# Patient Record
Sex: FEMALE | Race: OTHER | HISPANIC OR LATINO
[De-identification: names, ages, dates, MRNs, and addresses within clinical notes are randomized per-mention and may not be internally consistent; named-entity substitution may affect disease eponyms.]

---

## 2017-01-31 ENCOUNTER — OTHER (OUTPATIENT)
Age: 67
End: 2017-01-31

## 2017-08-23 ENCOUNTER — NON-APPOINTMENT (OUTPATIENT)
Age: 67
End: 2017-08-23

## 2017-08-23 ENCOUNTER — APPOINTMENT (OUTPATIENT)
Dept: CARDIOLOGY | Facility: CLINIC | Age: 67
End: 2017-08-23
Payer: MEDICARE

## 2017-08-23 VITALS
DIASTOLIC BLOOD PRESSURE: 70 MMHG | WEIGHT: 125 LBS | BODY MASS INDEX: 22.15 KG/M2 | HEART RATE: 77 BPM | HEIGHT: 63 IN | SYSTOLIC BLOOD PRESSURE: 112 MMHG | OXYGEN SATURATION: 96 %

## 2017-08-23 PROCEDURE — 99214 OFFICE O/P EST MOD 30 MIN: CPT

## 2017-08-23 PROCEDURE — 93000 ELECTROCARDIOGRAM COMPLETE: CPT

## 2018-02-14 ENCOUNTER — APPOINTMENT (OUTPATIENT)
Dept: CARDIOLOGY | Facility: CLINIC | Age: 68
End: 2018-02-14

## 2018-06-13 ENCOUNTER — NON-APPOINTMENT (OUTPATIENT)
Age: 68
End: 2018-06-13

## 2018-06-13 ENCOUNTER — APPOINTMENT (OUTPATIENT)
Dept: CARDIOLOGY | Facility: CLINIC | Age: 68
End: 2018-06-13
Payer: MEDICARE

## 2018-06-13 VITALS
DIASTOLIC BLOOD PRESSURE: 70 MMHG | BODY MASS INDEX: 22.15 KG/M2 | WEIGHT: 125 LBS | HEART RATE: 57 BPM | OXYGEN SATURATION: 95 % | HEIGHT: 63 IN | SYSTOLIC BLOOD PRESSURE: 122 MMHG

## 2018-06-13 DIAGNOSIS — R00.1 BRADYCARDIA, UNSPECIFIED: ICD-10-CM

## 2018-06-13 PROCEDURE — 93000 ELECTROCARDIOGRAM COMPLETE: CPT

## 2018-06-13 PROCEDURE — 99214 OFFICE O/P EST MOD 30 MIN: CPT

## 2018-06-14 ENCOUNTER — APPOINTMENT (OUTPATIENT)
Dept: CARDIOLOGY | Facility: CLINIC | Age: 68
End: 2018-06-14
Payer: MEDICARE

## 2018-06-14 PROBLEM — R00.1 SINUS BRADYCARDIA: Status: ACTIVE | Noted: 2017-08-23

## 2018-06-14 PROCEDURE — 93306 TTE W/DOPPLER COMPLETE: CPT

## 2018-06-14 PROCEDURE — 93227 XTRNL ECG REC<48 HR R&I: CPT

## 2018-06-14 PROCEDURE — 93225 XTRNL ECG REC<48 HRS REC: CPT

## 2018-07-10 ENCOUNTER — APPOINTMENT (OUTPATIENT)
Dept: CARDIOLOGY | Facility: CLINIC | Age: 68
End: 2018-07-10

## 2018-08-08 ENCOUNTER — APPOINTMENT (OUTPATIENT)
Dept: CARDIOLOGY | Facility: CLINIC | Age: 68
End: 2018-08-08

## 2018-09-25 ENCOUNTER — APPOINTMENT (OUTPATIENT)
Dept: CARDIOLOGY | Facility: CLINIC | Age: 68
End: 2018-09-25
Payer: MEDICARE

## 2018-09-25 DIAGNOSIS — R94.31 ABNORMAL ELECTROCARDIOGRAM [ECG] [EKG]: ICD-10-CM

## 2018-09-25 PROCEDURE — 93015 CV STRESS TEST SUPVJ I&R: CPT

## 2018-09-26 PROBLEM — R94.31 ABNORMAL ECG DURING EXERCISE STRESS TEST: Status: ACTIVE | Noted: 2018-09-26

## 2018-10-29 ENCOUNTER — APPOINTMENT (OUTPATIENT)
Dept: CARDIOLOGY | Facility: CLINIC | Age: 68
End: 2018-10-29
Payer: MEDICARE

## 2018-10-29 PROCEDURE — 78452 HT MUSCLE IMAGE SPECT MULT: CPT

## 2018-10-29 PROCEDURE — 93015 CV STRESS TEST SUPVJ I&R: CPT

## 2018-10-29 PROCEDURE — A9500: CPT

## 2020-01-09 ENCOUNTER — EMERGENCY (EMERGENCY)
Facility: HOSPITAL | Age: 70
LOS: 1 days | Discharge: HOME | End: 2020-01-09
Attending: EMERGENCY MEDICINE
Payer: MEDICARE

## 2020-01-09 VITALS
OXYGEN SATURATION: 98 % | DIASTOLIC BLOOD PRESSURE: 95 MMHG | TEMPERATURE: 98 F | SYSTOLIC BLOOD PRESSURE: 169 MMHG | RESPIRATION RATE: 16 BRPM | HEART RATE: 72 BPM

## 2020-01-09 VITALS
HEART RATE: 63 BPM | OXYGEN SATURATION: 100 % | SYSTOLIC BLOOD PRESSURE: 138 MMHG | DIASTOLIC BLOOD PRESSURE: 85 MMHG | TEMPERATURE: 99 F | RESPIRATION RATE: 15 BRPM

## 2020-01-09 DIAGNOSIS — Z90.49 ACQUIRED ABSENCE OF OTHER SPECIFIED PARTS OF DIGESTIVE TRACT: Chronic | ICD-10-CM

## 2020-01-09 DIAGNOSIS — H26.40 UNSPECIFIED SECONDARY CATARACT: Chronic | ICD-10-CM

## 2020-01-09 LAB
ALBUMIN SERPL ELPH-MCNC: 4.3 G/DL — SIGNIFICANT CHANGE UP (ref 3.3–5)
ALP SERPL-CCNC: 55 U/L — SIGNIFICANT CHANGE UP (ref 40–120)
ALT FLD-CCNC: 19 U/L — SIGNIFICANT CHANGE UP (ref 10–45)
ANION GAP SERPL CALC-SCNC: 13 MMOL/L — SIGNIFICANT CHANGE UP (ref 5–17)
APPEARANCE UR: CLEAR — SIGNIFICANT CHANGE UP
APTT BLD: 27.3 SEC — LOW (ref 27.5–36.3)
AST SERPL-CCNC: 16 U/L — SIGNIFICANT CHANGE UP (ref 10–40)
BACTERIA # UR AUTO: NEGATIVE — SIGNIFICANT CHANGE UP
BASOPHILS # BLD AUTO: 0.06 K/UL — SIGNIFICANT CHANGE UP (ref 0–0.2)
BASOPHILS NFR BLD AUTO: 0.9 % — SIGNIFICANT CHANGE UP (ref 0–2)
BILIRUB SERPL-MCNC: 0.5 MG/DL — SIGNIFICANT CHANGE UP (ref 0.2–1.2)
BILIRUB UR-MCNC: NEGATIVE — SIGNIFICANT CHANGE UP
BUN SERPL-MCNC: 14 MG/DL — SIGNIFICANT CHANGE UP (ref 7–23)
CALCIUM SERPL-MCNC: 9.3 MG/DL — SIGNIFICANT CHANGE UP (ref 8.4–10.5)
CHLORIDE SERPL-SCNC: 102 MMOL/L — SIGNIFICANT CHANGE UP (ref 96–108)
CO2 SERPL-SCNC: 25 MMOL/L — SIGNIFICANT CHANGE UP (ref 22–31)
COLOR SPEC: COLORLESS — SIGNIFICANT CHANGE UP
CREAT SERPL-MCNC: 0.83 MG/DL — SIGNIFICANT CHANGE UP (ref 0.5–1.3)
DIFF PNL FLD: ABNORMAL
EOSINOPHIL # BLD AUTO: 0.02 K/UL — SIGNIFICANT CHANGE UP (ref 0–0.5)
EOSINOPHIL NFR BLD AUTO: 0.3 % — SIGNIFICANT CHANGE UP (ref 0–6)
EPI CELLS # UR: 0 /HPF — SIGNIFICANT CHANGE UP
GLUCOSE SERPL-MCNC: 103 MG/DL — HIGH (ref 70–99)
GLUCOSE UR QL: NEGATIVE — SIGNIFICANT CHANGE UP
HCT VFR BLD CALC: 37.8 % — SIGNIFICANT CHANGE UP (ref 34.5–45)
HGB BLD-MCNC: 12.4 G/DL — SIGNIFICANT CHANGE UP (ref 11.5–15.5)
HYALINE CASTS # UR AUTO: 0 /LPF — SIGNIFICANT CHANGE UP (ref 0–2)
IMM GRANULOCYTES NFR BLD AUTO: 0.2 % — SIGNIFICANT CHANGE UP (ref 0–1.5)
INR BLD: 1.05 RATIO — SIGNIFICANT CHANGE UP (ref 0.88–1.16)
KETONES UR-MCNC: NEGATIVE — SIGNIFICANT CHANGE UP
LEUKOCYTE ESTERASE UR-ACNC: NEGATIVE — SIGNIFICANT CHANGE UP
LYMPHOCYTES # BLD AUTO: 2.79 K/UL — SIGNIFICANT CHANGE UP (ref 1–3.3)
LYMPHOCYTES # BLD AUTO: 42.5 % — SIGNIFICANT CHANGE UP (ref 13–44)
MCHC RBC-ENTMCNC: 28.1 PG — SIGNIFICANT CHANGE UP (ref 27–34)
MCHC RBC-ENTMCNC: 32.8 GM/DL — SIGNIFICANT CHANGE UP (ref 32–36)
MCV RBC AUTO: 85.5 FL — SIGNIFICANT CHANGE UP (ref 80–100)
MONOCYTES # BLD AUTO: 0.25 K/UL — SIGNIFICANT CHANGE UP (ref 0–0.9)
MONOCYTES NFR BLD AUTO: 3.8 % — SIGNIFICANT CHANGE UP (ref 2–14)
NEUTROPHILS # BLD AUTO: 3.43 K/UL — SIGNIFICANT CHANGE UP (ref 1.8–7.4)
NEUTROPHILS NFR BLD AUTO: 52.3 % — SIGNIFICANT CHANGE UP (ref 43–77)
NITRITE UR-MCNC: NEGATIVE — SIGNIFICANT CHANGE UP
NRBC # BLD: 0 /100 WBCS — SIGNIFICANT CHANGE UP (ref 0–0)
PH UR: 7 — SIGNIFICANT CHANGE UP (ref 5–8)
PLATELET # BLD AUTO: 220 K/UL — SIGNIFICANT CHANGE UP (ref 150–400)
POTASSIUM SERPL-MCNC: 3.5 MMOL/L — SIGNIFICANT CHANGE UP (ref 3.5–5.3)
POTASSIUM SERPL-SCNC: 3.5 MMOL/L — SIGNIFICANT CHANGE UP (ref 3.5–5.3)
PROT SERPL-MCNC: 7.4 G/DL — SIGNIFICANT CHANGE UP (ref 6–8.3)
PROT UR-MCNC: NEGATIVE — SIGNIFICANT CHANGE UP
PROTHROM AB SERPL-ACNC: 12 SEC — SIGNIFICANT CHANGE UP (ref 10–12.9)
RBC # BLD: 4.42 M/UL — SIGNIFICANT CHANGE UP (ref 3.8–5.2)
RBC # FLD: 12.7 % — SIGNIFICANT CHANGE UP (ref 10.3–14.5)
RBC CASTS # UR COMP ASSIST: 1 /HPF — SIGNIFICANT CHANGE UP (ref 0–4)
SODIUM SERPL-SCNC: 140 MMOL/L — SIGNIFICANT CHANGE UP (ref 135–145)
SP GR SPEC: 1.01 — LOW (ref 1.01–1.02)
TROPONIN T, HIGH SENSITIVITY RESULT: <6 NG/L — SIGNIFICANT CHANGE UP (ref 0–51)
UROBILINOGEN FLD QL: NEGATIVE — SIGNIFICANT CHANGE UP
WBC # BLD: 6.56 K/UL — SIGNIFICANT CHANGE UP (ref 3.8–10.5)
WBC # FLD AUTO: 6.56 K/UL — SIGNIFICANT CHANGE UP (ref 3.8–10.5)
WBC UR QL: 0 /HPF — SIGNIFICANT CHANGE UP (ref 0–5)

## 2020-01-09 PROCEDURE — 99284 EMERGENCY DEPT VISIT MOD MDM: CPT

## 2020-01-09 PROCEDURE — 70450 CT HEAD/BRAIN W/O DYE: CPT

## 2020-01-09 PROCEDURE — 84484 ASSAY OF TROPONIN QUANT: CPT

## 2020-01-09 PROCEDURE — 81001 URINALYSIS AUTO W/SCOPE: CPT

## 2020-01-09 PROCEDURE — 85730 THROMBOPLASTIN TIME PARTIAL: CPT

## 2020-01-09 PROCEDURE — 70450 CT HEAD/BRAIN W/O DYE: CPT | Mod: 26

## 2020-01-09 PROCEDURE — 71046 X-RAY EXAM CHEST 2 VIEWS: CPT

## 2020-01-09 PROCEDURE — 71046 X-RAY EXAM CHEST 2 VIEWS: CPT | Mod: 26

## 2020-01-09 PROCEDURE — 93005 ELECTROCARDIOGRAM TRACING: CPT

## 2020-01-09 PROCEDURE — 85027 COMPLETE CBC AUTOMATED: CPT

## 2020-01-09 PROCEDURE — 85610 PROTHROMBIN TIME: CPT

## 2020-01-09 PROCEDURE — 99284 EMERGENCY DEPT VISIT MOD MDM: CPT | Mod: 25

## 2020-01-09 PROCEDURE — 80053 COMPREHEN METABOLIC PANEL: CPT

## 2020-01-09 NOTE — ED PROVIDER NOTE - PHYSICAL EXAMINATION
Attending Betty Jones: Gen: NAD, heent: atrauamtic, eomi, perrla, mmm, op pink, uvula midline, neck; nttp, no nuchal rigidity, chest: nttp, no crepitus, cv: rrr, no murmurs, lungs: ctab, abd: soft, nontender, nondistended, no peritoneal signs, +BS, no guarding, ext: wwp, neg homans, skin: no rash, neuro: awake and alert, following commands, speech clear, sensation and strength intact, no focal deficits, resting tremor

## 2020-01-09 NOTE — ED PROVIDER NOTE - NEUROLOGICAL, MLM
Alert and oriented,  right LE with resting tremor, no focal deficits, no motor or sensory deficits. finger to nose and rapid alternating movement intact

## 2020-01-09 NOTE — ED ADULT NURSE NOTE - OBJECTIVE STATEMENT
70 y/o female with PMH HTN HLD vertigo arthritis presenting to ED for multiple medical complaints. per pt, in the last 2 weeks, she has increased shaking in her lower extremities, a pressure headache to the back of the head & pain to the back of the neck, intermittent chest pressure. Upon exam pt A&Ox3 gross neuro intact, lungs cta bilaterally, no difficulty speaking in complete sentences, s1s2 heart sounds heard, pulses x 4, amos x4, abdomen soft nontender nondistended, skin intact. Pt denies chest pain at this time, sob, ha, n/v/d, abdominal pain, f/c, urinary symptoms, hematuria. placed on cardiac monitor NSR 70s. 18 gauge placed to left a/c. 68 y/o female with PMH HTN HLD vertigo arthritis presenting to ED for multiple medical complaints. per pt, in the last 2 weeks, she has increased shaking in her lower extremities, a pressure headache to the back of the head & pain to the back of the neck, intermittent chest pressure. Upon exam pt A&Ox3 gross neuro intact, no facial droop noted, strength equal in all extremities, no loss in sensation, 2 mm PERRLA, lungs cta bilaterally, no difficulty speaking in complete sentences, s1s2 heart sounds heard, pulses x 4, amos x4, abdomen soft nontender nondistended, skin intact. Pt denies chest pain at this time, sob, ha, n/v/d, abdominal pain, f/c, urinary symptoms, hematuria. placed on cardiac monitor NSR 70s. 18 gauge placed to left a/c.

## 2020-01-09 NOTE — ED PROVIDER NOTE - PATIENT PORTAL LINK FT
You can access the FollowMyHealth Patient Portal offered by [facility] by registering at the following website: [email or web address]. By joining [employer]’s FollowMyHealth portal, you will also be able to view your health information using other applications (apps) compatible with our system.

## 2020-01-09 NOTE — ED PROVIDER NOTE - NSFOLLOWUPCLINICS_GEN_ALL_ED_FT
[facility]  Neurology  [address] [place] [address] [address] [place] [address]  Phone: [phone]  Fax:   Follow Up Time:

## 2020-01-09 NOTE — ED ADULT NURSE NOTE - PMH
Arthritis    Chronic back pain    GERD with esophagitis    HLD (hyperlipidemia)    HTN (hypertension)    HTN (hypertension)    Hyperlipidemia    Palpitations    Panic attack    Vertigo    Vertigo

## 2020-01-09 NOTE — ED PROVIDER NOTE - OBJECTIVE STATEMENT
68 y/o female htn, hld, vertigo presents to the ED for vague dizziness today that started after her shower this morning. states not true room spinning sensation but did feel some associated visual symptoms. patient did not have diplopia but states it was mildly blurred. additionally she had an episode of mid chest discomfort described as a pressure that lasted only a few seconds then resolved. + nausea. no headache/fever/chills/vomiting/diarrhea/urinary complaints. patient states she has had no symptoms since she got here. of note patient under care of neurologist for resting tremor that has been bothersome for multiple months and does not seem to be getting better.

## 2020-01-09 NOTE — ED PROVIDER NOTE - PROGRESS NOTE DETAILS
spoke with patient, she is ambulating, feeling well now. discussed results and follow up information. states she would like a new neuro referral because she is frustrated with the lack of treatment for tremor. given opportunity to ask questions - Navya Morales PA-C

## 2020-01-09 NOTE — ED PROVIDER NOTE - RESPIRATORY [-], MLM
no shortness of breath Implemented All Universal Safety Interventions:  [place] to call system. Call bell, personal items and telephone within reach. Instruct patient to call for assistance. Room bathroom lighting operational. Non-slip footwear when patient is off stretcher. Physically safe environment: no spills, clutter or unnecessary equipment. Stretcher in lowest position, wheels locked, appropriate side rails in place.

## 2020-01-09 NOTE — ED PROVIDER NOTE - ATTENDING CONTRIBUTION TO CARE
Attending MD Betty Jones:   I personally have seen and examined this patient.  Physician assistant note reviewed and agree on plan of care and except where noted.  See HPI, PE, and MDM for details.

## 2020-01-09 NOTE — ED PROVIDER NOTE - CLINICAL SUMMARY MEDICAL DECISION MAKING FREE TEXT BOX
Attending Betty Jones: 70 y/o female presenting with resolved chest pain and concern for tremor. pt has seen her neurologist for tremor and reportedly had comprehensive work up. pt with resting tremor on exam athat resolves with effort. no fevers or chills. pt was started on medication for tremor with neurologist. pt also reports chest pain that started earlier in the day. currently denies any chest pain. ekg shows no evidence of st elevation. pt on monitor. b/l pulses in place and as pt does not have current discomfort less likely dissection. troponin negative making acute myocardial ischemia less likely. pt has neurologist to follow up with for tremor and cards followup. no coughs or fevers, no evidence of infectious etiology on exam. plan to d/c with return precautions

## 2020-02-01 ENCOUNTER — OUTPATIENT (OUTPATIENT)
Dept: OUTPATIENT SERVICES | Facility: HOSPITAL | Age: 70
LOS: 1 days | End: 2020-02-01
Payer: MEDICARE

## 2020-02-01 DIAGNOSIS — Z90.49 ACQUIRED ABSENCE OF OTHER SPECIFIED PARTS OF DIGESTIVE TRACT: Chronic | ICD-10-CM

## 2020-02-01 DIAGNOSIS — H26.40 UNSPECIFIED SECONDARY CATARACT: Chronic | ICD-10-CM

## 2020-02-26 DIAGNOSIS — Z71.89 OTHER SPECIFIED COUNSELING: ICD-10-CM

## 2020-03-01 PROCEDURE — G9001: CPT

## 2020-10-01 ENCOUNTER — EMERGENCY (EMERGENCY)
Facility: HOSPITAL | Age: 70
LOS: 1 days | Discharge: HOME | End: 2020-10-01
Attending: EMERGENCY MEDICINE
Payer: MEDICARE

## 2020-10-01 VITALS
SYSTOLIC BLOOD PRESSURE: 123 MMHG | OXYGEN SATURATION: 100 % | DIASTOLIC BLOOD PRESSURE: 81 MMHG | RESPIRATION RATE: 18 BRPM | HEART RATE: 86 BPM | TEMPERATURE: 99 F

## 2020-10-01 VITALS
WEIGHT: 117.07 LBS | RESPIRATION RATE: 17 BRPM | HEIGHT: 63 IN | HEART RATE: 66 BPM | OXYGEN SATURATION: 99 % | TEMPERATURE: 98 F | SYSTOLIC BLOOD PRESSURE: 152 MMHG | DIASTOLIC BLOOD PRESSURE: 92 MMHG

## 2020-10-01 DIAGNOSIS — Z90.49 ACQUIRED ABSENCE OF OTHER SPECIFIED PARTS OF DIGESTIVE TRACT: Chronic | ICD-10-CM

## 2020-10-01 DIAGNOSIS — H26.40 UNSPECIFIED SECONDARY CATARACT: Chronic | ICD-10-CM

## 2020-10-01 LAB
ALBUMIN SERPL ELPH-MCNC: 4.8 G/DL — SIGNIFICANT CHANGE UP (ref 3.3–5)
ALP SERPL-CCNC: 57 U/L — SIGNIFICANT CHANGE UP (ref 40–120)
ALT FLD-CCNC: 16 U/L — SIGNIFICANT CHANGE UP (ref 10–45)
ANION GAP SERPL CALC-SCNC: 12 MMOL/L — SIGNIFICANT CHANGE UP (ref 5–17)
APTT BLD: 26.5 SEC — LOW (ref 27.5–35.5)
AST SERPL-CCNC: 14 U/L — SIGNIFICANT CHANGE UP (ref 10–40)
BASOPHILS # BLD AUTO: 0.05 K/UL — SIGNIFICANT CHANGE UP (ref 0–0.2)
BASOPHILS NFR BLD AUTO: 0.7 % — SIGNIFICANT CHANGE UP (ref 0–2)
BILIRUB SERPL-MCNC: 0.4 MG/DL — SIGNIFICANT CHANGE UP (ref 0.2–1.2)
BUN SERPL-MCNC: 14 MG/DL — SIGNIFICANT CHANGE UP (ref 7–23)
CALCIUM SERPL-MCNC: 9.6 MG/DL — SIGNIFICANT CHANGE UP (ref 8.4–10.5)
CHLORIDE SERPL-SCNC: 104 MMOL/L — SIGNIFICANT CHANGE UP (ref 96–108)
CO2 SERPL-SCNC: 25 MMOL/L — SIGNIFICANT CHANGE UP (ref 22–31)
CREAT SERPL-MCNC: 0.82 MG/DL — SIGNIFICANT CHANGE UP (ref 0.5–1.3)
EOSINOPHIL # BLD AUTO: 0.01 K/UL — SIGNIFICANT CHANGE UP (ref 0–0.5)
EOSINOPHIL NFR BLD AUTO: 0.1 % — SIGNIFICANT CHANGE UP (ref 0–6)
GLUCOSE SERPL-MCNC: 105 MG/DL — HIGH (ref 70–99)
HCT VFR BLD CALC: 40.4 % — SIGNIFICANT CHANGE UP (ref 34.5–45)
HGB BLD-MCNC: 12.9 G/DL — SIGNIFICANT CHANGE UP (ref 11.5–15.5)
IMM GRANULOCYTES NFR BLD AUTO: 0.3 % — SIGNIFICANT CHANGE UP (ref 0–1.5)
INR BLD: 0.98 RATIO — SIGNIFICANT CHANGE UP (ref 0.88–1.16)
LIDOCAIN IGE QN: 62 U/L — HIGH (ref 7–60)
LYMPHOCYTES # BLD AUTO: 2.43 K/UL — SIGNIFICANT CHANGE UP (ref 1–3.3)
LYMPHOCYTES # BLD AUTO: 32.8 % — SIGNIFICANT CHANGE UP (ref 13–44)
MAGNESIUM SERPL-MCNC: 2.5 MG/DL — SIGNIFICANT CHANGE UP (ref 1.6–2.6)
MCHC RBC-ENTMCNC: 27.8 PG — SIGNIFICANT CHANGE UP (ref 27–34)
MCHC RBC-ENTMCNC: 31.9 GM/DL — LOW (ref 32–36)
MCV RBC AUTO: 87.1 FL — SIGNIFICANT CHANGE UP (ref 80–100)
MONOCYTES # BLD AUTO: 0.36 K/UL — SIGNIFICANT CHANGE UP (ref 0–0.9)
MONOCYTES NFR BLD AUTO: 4.9 % — SIGNIFICANT CHANGE UP (ref 2–14)
NEUTROPHILS # BLD AUTO: 4.54 K/UL — SIGNIFICANT CHANGE UP (ref 1.8–7.4)
NEUTROPHILS NFR BLD AUTO: 61.2 % — SIGNIFICANT CHANGE UP (ref 43–77)
NRBC # BLD: 0 /100 WBCS — SIGNIFICANT CHANGE UP (ref 0–0)
NT-PROBNP SERPL-SCNC: 98 PG/ML — SIGNIFICANT CHANGE UP (ref 0–300)
PLATELET # BLD AUTO: 224 K/UL — SIGNIFICANT CHANGE UP (ref 150–400)
POTASSIUM SERPL-MCNC: 4.1 MMOL/L — SIGNIFICANT CHANGE UP (ref 3.5–5.3)
POTASSIUM SERPL-SCNC: 4.1 MMOL/L — SIGNIFICANT CHANGE UP (ref 3.5–5.3)
PROT SERPL-MCNC: 7.9 G/DL — SIGNIFICANT CHANGE UP (ref 6–8.3)
PROTHROM AB SERPL-ACNC: 11.8 SEC — SIGNIFICANT CHANGE UP (ref 10.6–13.6)
RBC # BLD: 4.64 M/UL — SIGNIFICANT CHANGE UP (ref 3.8–5.2)
RBC # FLD: 13.2 % — SIGNIFICANT CHANGE UP (ref 10.3–14.5)
SODIUM SERPL-SCNC: 141 MMOL/L — SIGNIFICANT CHANGE UP (ref 135–145)
TROPONIN T, HIGH SENSITIVITY RESULT: <6 NG/L — SIGNIFICANT CHANGE UP (ref 0–51)
WBC # BLD: 7.41 K/UL — SIGNIFICANT CHANGE UP (ref 3.8–10.5)
WBC # FLD AUTO: 7.41 K/UL — SIGNIFICANT CHANGE UP (ref 3.8–10.5)

## 2020-10-01 PROCEDURE — 99285 EMERGENCY DEPT VISIT HI MDM: CPT

## 2020-10-01 PROCEDURE — 85730 THROMBOPLASTIN TIME PARTIAL: CPT

## 2020-10-01 PROCEDURE — 71046 X-RAY EXAM CHEST 2 VIEWS: CPT

## 2020-10-01 PROCEDURE — 85025 COMPLETE CBC W/AUTO DIFF WBC: CPT

## 2020-10-01 PROCEDURE — 99284 EMERGENCY DEPT VISIT MOD MDM: CPT | Mod: 25

## 2020-10-01 PROCEDURE — 85610 PROTHROMBIN TIME: CPT

## 2020-10-01 PROCEDURE — 83690 ASSAY OF LIPASE: CPT

## 2020-10-01 PROCEDURE — 80053 COMPREHEN METABOLIC PANEL: CPT

## 2020-10-01 PROCEDURE — 83880 ASSAY OF NATRIURETIC PEPTIDE: CPT

## 2020-10-01 PROCEDURE — 93010 ELECTROCARDIOGRAM REPORT: CPT

## 2020-10-01 PROCEDURE — 83735 ASSAY OF MAGNESIUM: CPT

## 2020-10-01 PROCEDURE — 93308 TTE F-UP OR LMTD: CPT

## 2020-10-01 PROCEDURE — 71046 X-RAY EXAM CHEST 2 VIEWS: CPT | Mod: 26

## 2020-10-01 PROCEDURE — 84484 ASSAY OF TROPONIN QUANT: CPT

## 2020-10-01 PROCEDURE — 93005 ELECTROCARDIOGRAM TRACING: CPT

## 2020-10-01 RX ORDER — ASPIRIN/CALCIUM CARB/MAGNESIUM 324 MG
162 TABLET ORAL ONCE
Refills: 0 | Status: COMPLETED | OUTPATIENT
Start: 2020-10-01 | End: 2020-10-01

## 2020-10-01 RX ADMIN — Medication 162 MILLIGRAM(S): at 15:07

## 2020-10-01 NOTE — ED PROVIDER NOTE - ATTENDING CONTRIBUTION TO CARE
Attending MD Betty Jones:  I personally have seen and examined this patient.  Resident note reviewed and agree on plan of care and except where noted.  See HPI, PE, and MDM for details.

## 2020-10-01 NOTE — ED PROVIDER NOTE - CARE PROVIDER_API CALL
Anupam Song  NEUROLOGY  [address] [facility] [facility] [address], [address]  [place] [address]  Phone: [phone]  Fax: [phone]  Follow Up Time: Urgent    John Muñoz  CARDIOLOGY  [address] [place] [address] Drive, [address]  [place] [address]  Phone: [phone]  Fax: [phone]  Follow Up Time: Urgent

## 2020-10-01 NOTE — ED PROVIDER NOTE - PROVIDER TOKENS
PROVIDER:[TOKEN:[[id number]],FOLLOWUP:[Urgent]],PROVIDER:[TOKEN:[[id number]],FOLLOWUP:[Urgent]]

## 2020-10-01 NOTE — ED PROVIDER NOTE - PATIENT PORTAL LINK FT
You can access the FollowMyHealth Patient Portal offered by [facility] by registering at the following website: [email or web address]. By joining [employer]’s FollowMyHealth portal, you will also be able to view your health information using other applications (apps) compatible with our system.

## 2020-10-01 NOTE — ED ADULT NURSE NOTE - CAS ELECT INFOMATION PROVIDED
DC instructions/Discharge provided to patient, patient requesting name DC instructions/Discharge instructions provided to patient. Patient ambulated out of er with steady gait accompanied by family. MIKE Andrade

## 2020-10-01 NOTE — ED PROVIDER NOTE - PHYSICAL EXAMINATION
Attending Betty Jones: Gen: NAD, heent: atrauamtic, eomi, perrla, mmm, op pink, uvula midline, neck; nttp, no nuchal rigidity, chest: nttp, no crepitus, cv: rrr, no murmurs, lungs: ctab, abd: soft, nontender, nondistended, no peritoneal signs, +BS, no guarding, ext: wwp, neg homans, skin: no rash, neuro: awake and alert, following commands, speech clear, sensation and strength intact, no focal deficits

## 2020-10-01 NOTE — ED PROVIDER NOTE - PROGRESS NOTE DETAILS
Cantu: patient and son informed of results, offered cdu for further testing but patient declines, spoke to PCP Dr. Clemons who states patient can follow up tomorrow, patient requesting neurology follow up as well, informed patient of getting further cardiac testing the next few days

## 2020-10-01 NOTE — ED ADULT NURSE REASSESSMENT NOTE - NS ED NURSE REASSESS COMMENT FT1
Patient discharged, IV brendan removed, patient verbalizes understanding of discharge instructions and agrees to following up with cardiologist/neurologist, and returning to emergency room for worsening symptoms. Patient ambulated out of er with steady gait. MIKE Andrade
1600 Patient resting in bed alert and oriented x 3, eating soup, patient denies chest pain, dizziness, blurry vision, shortness of breath and all other complaints, respirations even and nonlabored, breathing spontaneously on room air, vital signs stable. Patient to be discharged, requesting name of recommended neurologist to see outpatient due to tremors. No tremors noted currently, speech clear, pupils equal and reactive to light, no facial droop noted, normal strength/ROM in bilateral upper and lower extremities. MD Pepe Roe aware of patient's request for neurologist and at bedside, states patient OK to be discharged, pending discharge paperwork. Will continue to monitor. MIKE Andrade

## 2020-10-01 NOTE — ED PROVIDER NOTE - CLINICAL SUMMARY MEDICAL DECISION MAKING FREE TEXT BOX
70F w/ HTN, HLD presents w/ chest pain since yesterday, recent stress test 6 months ago that was WNL, patient w/ EKG WNL, will r/o acs, patient appears well w/ normal VS 70F w/ HTN, HLD presents w/ chest pain since yesterday, recent stress test 6 months ago that was WNL, patient w/ EKG WNL, will r/o acs, patient appears well w/ normal VS  Attending Betty Jones: 69 y/o female presenting with intermittent left sided cheat pain. upon arrival ekg without evidence of st elevation. pt placed on monitor. does have risk factors fro acs. initial troponin negative. offered pt CDU for tele and monitoring. pt does not want to stay in the ed and prefers to go home. long discussion with pt and will follow up with dr escobedo and given cardiology follow up. pt currently denies anychest pain . will return for any worsening pain, back discomfort of sob. hemodynamically stable and no pleuritic pain or sob making PE unlikely. pt well appearing, equal pressures in upper extremities making dissection less likely. no RUQ ttp.

## 2020-10-01 NOTE — ED PROVIDER NOTE - NS ED ROS FT
General: denies fever, chills  HENT: denies nasal congestion, rhinorrhea  Eyes: denies visual changes, blurred vision  CV: + chest pain, denies palpitations  Resp: denies difficulty breathing, cough  Abdominal: denies nausea, vomiting, diarrhea, abdominal pain  MSK: denies muscle aches, leg swelling  Neuro: denies headaches, numbness, tingling  Skin: denies rashes, bruises  Pysch: denies HI/SI  Heme: denies petechia, abnormal bleeding

## 2020-10-01 NOTE — ED PROVIDER NOTE - OBJECTIVE STATEMENT
Attending Betty Jones: 71 y/o female presenting with chest pain for last 2 days. pain located on left side of chest and radiates to left neck. pain associated with some nausea no vomiting. pt denies any abdominal pain. pt described the pain as pressure. no h/o similar. h/o hyperlipidemia and HTN. pt denies and SOB or difficulty breathing. pain is intermittent since starting. no associated fevers or diaphoresis. pain unchanged with exertion. went to [place] 3 weeks. ago. Attending Betty Jones: 69 y/o female presenting with chest pain for last 2 days. pain located on left side of chest and radiates to left neck. pain associated with some nausea no vomiting. pt denies any abdominal pain. pt described the pain as pressure. no h/o similar. h/o hyperlipidemia and HTN. pt denies and SOB or difficulty breathing. pain is intermittent since starting. no associated fevers or diaphoresis. pain unchanged with exertion. went to [place] 3 weeks. ago. had stress performed 6 months ago

## 2020-10-01 NOTE — ED ADULT NURSE NOTE - OBJECTIVE STATEMENT
70F to ED c/o chest pain since last night, nausea without vomiting, and intermittent abdominal pain. Patient is primarily Green[place]ic speaking, alert and oriented x4, calm/cooperative, NAD, VSS. Patient is put on cardiac monitor, NSR at this time. Patient is on room air, see VS. Patient states that she had a stress test 6 months ago. Patient states that she was at her doctor today and was told to come to the ED. Patient has 18 gauge placed to R AC in the ED.

## 2020-10-28 ENCOUNTER — APPOINTMENT (OUTPATIENT)
Dept: CARDIOLOGY | Facility: CLINIC | Age: 70
End: 2020-10-28
Payer: MEDICARE

## 2020-10-28 ENCOUNTER — TRANSCRIPTION ENCOUNTER (OUTPATIENT)
Age: 70
End: 2020-10-28

## 2020-10-28 ENCOUNTER — NON-APPOINTMENT (OUTPATIENT)
Age: 70
End: 2020-10-28

## 2020-10-28 VITALS
HEIGHT: 63 IN | WEIGHT: 116 LBS | SYSTOLIC BLOOD PRESSURE: 130 MMHG | BODY MASS INDEX: 20.55 KG/M2 | OXYGEN SATURATION: 95 % | DIASTOLIC BLOOD PRESSURE: 80 MMHG | HEART RATE: 79 BPM | TEMPERATURE: 98.4 F

## 2020-10-28 DIAGNOSIS — R00.2 PALPITATIONS: ICD-10-CM

## 2020-10-28 DIAGNOSIS — R09.89 OTHER SPECIFIED SYMPTOMS AND SIGNS INVOLVING THE CIRCULATORY AND RESPIRATORY SYSTEMS: ICD-10-CM

## 2020-10-28 DIAGNOSIS — Z23 ENCOUNTER FOR IMMUNIZATION: ICD-10-CM

## 2020-10-28 PROCEDURE — 93306 TTE W/DOPPLER COMPLETE: CPT

## 2020-10-28 PROCEDURE — 90662 IIV NO PRSV INCREASED AG IM: CPT

## 2020-10-28 PROCEDURE — G0008: CPT

## 2020-10-28 PROCEDURE — 99215 OFFICE O/P EST HI 40 MIN: CPT

## 2020-10-28 PROCEDURE — 93000 ELECTROCARDIOGRAM COMPLETE: CPT

## 2020-10-28 PROCEDURE — 99072 ADDL SUPL MATRL&STAF TM PHE: CPT

## 2020-10-28 NOTE — REASON FOR VISIT
[Follow-Up - Clinic] : a clinic follow-up of [Abnormal ECG] : an abnormal ECG [Hyperlipidemia] : hyperlipidemia [Hypertension] : hypertension [Palpitations] : palpitations [Dizziness] : dizziness

## 2020-10-28 NOTE — HISTORY OF PRESENT ILLNESS
[FreeTextEntry1] : Quiana is a pleasant 70-year-old female with dyslipidemia, hypertension, and recently went to [facility] emergency department for palpitations and worsening generalized aches along with essential tremor. I reviewed her labs x-rays and EKG from the hospital along with head CT scan showing small vessel ischemic changes but no acute findings. It sounds as though she was given anticoagulation for unclear reasons if there is no clear evidence of arrhythmia. Essential tremor hampers the interpretation of her ECGs but it appears to be sinus rhythm. Lately she is complaining also of dizziness and intermittent palpitations. She is seen to have previous ectopy on prior Holter monitoring.She reports that she does need a flu vaccine as well.

## 2020-10-28 NOTE — DISCUSSION/SUMMARY
[___ Week(s)] : [unfilled] week(s) [FreeTextEntry3] : Echocardiogram, regular stress test, Holter monitor. [FreeTextEntry1] : She qill stay off her anticoagulation at this time as there is no clear indication as to why this was prescribed. She's not been taking anyhow. She'll continue on aspirin 81 mg daily, simvastatin 10 mg daily for cardiovascular risk reduction. She will continue with lisinopril 10 mg daily for blood pressure management. I prescribed propranolol milligrams b.i.d. or as directed possibly even t.i.d. for essential tremor management. She'll follow up with her neurologist she reports. We will give her the Flu vaccine today for Flu prevention as per her request.\par \par To further cardiac risk stratify, I have ordered an echocardiogram to assess LV function and valvular status.To better assess carotid disease burden and serve as a cardiovascular risk marker, especially with the possibility of a left carotid bruit on exam, I've ordered a carotid bilateral duplex study. I might consider a followup Holter monitor when I see her in followup in approximately one month to review testing bassed on how she is doing. She'll see neurology in follow up as well and follow up with you for general care. I recommended a heart healthy lifestyle including a low-saturated fat, low cholesterol diet with improved aerobic physical fitness over time for cardiovascular benefits. I will also order thyroid function tests.

## 2020-10-28 NOTE — REVIEW OF SYSTEMS
[Palpitations] : palpitations [Joint Pain] : joint pain [Anxiety] : anxiety [Negative] : Heme/Lymph [Dizziness] : dizziness

## 2020-10-29 ENCOUNTER — NON-APPOINTMENT (OUTPATIENT)
Age: 70
End: 2020-10-29

## 2020-12-08 ENCOUNTER — NON-APPOINTMENT (OUTPATIENT)
Age: 70
End: 2020-12-08

## 2020-12-08 ENCOUNTER — APPOINTMENT (OUTPATIENT)
Dept: CARDIOLOGY | Facility: CLINIC | Age: 70
End: 2020-12-08
Payer: MEDICARE

## 2020-12-08 VITALS
BODY MASS INDEX: 20.55 KG/M2 | HEART RATE: 111 BPM | WEIGHT: 116 LBS | TEMPERATURE: 97.2 F | OXYGEN SATURATION: 82 % | HEIGHT: 63 IN | SYSTOLIC BLOOD PRESSURE: 128 MMHG | DIASTOLIC BLOOD PRESSURE: 80 MMHG

## 2020-12-08 PROCEDURE — 99214 OFFICE O/P EST MOD 30 MIN: CPT

## 2020-12-08 PROCEDURE — 93000 ELECTROCARDIOGRAM COMPLETE: CPT

## 2020-12-08 PROCEDURE — 99072 ADDL SUPL MATRL&STAF TM PHE: CPT

## 2020-12-08 NOTE — PHYSICAL EXAM
[General Appearance - Well Developed] : well developed [Normal Appearance] : normal appearance [Well Groomed] : well groomed [General Appearance - Well Nourished] : well nourished [No Deformities] : no deformities [General Appearance - In No Acute Distress] : no acute distress [Normal Conjunctiva] : the conjunctiva exhibited no abnormalities [Eyelids - No Xanthelasma] : the eyelids demonstrated no xanthelasmas [Normal Oral Mucosa] : normal oral mucosa [No Oral Pallor] : no oral pallor [No Oral Cyanosis] : no oral cyanosis [Normal Jugular Venous A Waves Present] : normal jugular venous A waves present [Normal Jugular Venous V Waves Present] : normal jugular venous V waves present [No Jugular Venous Jones A Waves] : no jugular venous jones A waves [Respiration, Rhythm And Depth] : normal respiratory rhythm and effort [Exaggerated Use Of Accessory Muscles For Inspiration] : no accessory muscle use [Auscultation Breath Sounds / Voice Sounds] : lungs were clear to auscultation bilaterally [Heart Rate And Rhythm] : heart rate and rhythm were normal [Heart Sounds] : normal S1 and S2 [Murmurs] : no murmurs present [Edema] : no peripheral edema present [Left Carotid Bruit] : left carotid bruit heard [1+] : left 1+ [Bowel Sounds] : normal bowel sounds [Abdomen Soft] : soft [Abdomen Tenderness] : non-tender [Abdomen Mass (___ Cm)] : no abdominal mass palpated [Abnormal Walk] : normal gait [Gait - Sufficient For Exercise Testing] : the gait was sufficient for exercise testing [Nail Clubbing] : no clubbing of the fingernails [Cyanosis, Localized] : no localized cyanosis [Petechial Hemorrhages (___cm)] : no petechial hemorrhages [Skin Color & Pigmentation] : normal skin color and pigmentation [] : no rash [No Venous Stasis] : no venous stasis [Skin Lesions] : no skin lesions [No Skin Ulcers] : no skin ulcer [No Xanthoma] : no  xanthoma was observed [Oriented To Time, Place, And Person] : oriented to person, place, and time [Affect] : the affect was normal [Mood] : the mood was normal [No Anxiety] : not feeling anxious [Right Carotid Bruit] : no bruit heard over the right carotid [Bruit] : no bruit heard

## 2020-12-08 NOTE — HISTORY OF PRESENT ILLNESS
[FreeTextEntry1] : Quiana is a pleasant 70-year-old with hypertension, hyperlipidemia and ongoing issues with essential tremor.  She reports that her neurologic exam and testing performed with her private neurologist locally has been within acceptable limits, however she is quite discouraged with her level of tremor.  She reports that she ran out of her propranolol low-dose which had questionable benefits and she is now on Xanax with unclear benefits as well.  No current chest pains, shortness of breath, palpitations, syncope or edema noted.  Eating generally healthy.  Trouble with sleeping noted.

## 2020-12-08 NOTE — DISCUSSION/SUMMARY
[___ Month(s)] : [unfilled] month(s) [FreeTextEntry1] : She will continue on aspirin 81 mg daily and lisinopril 10 mg daily for cardiovascular risk reduction and blood pressure management and I will add propranolol 20 mg twice daily for further essential tremor management.  I reviewed her available cardiac data with her revealing an echo showing normal biventricular size and systolic function without significant valvular heart disease and grade 1 diastolic dysfunction noted indicating sinus rhythm.  I suggested a second opinion neurologist at [facility] neurology on the [place] as perhaps she might have a component of Parkinson's or other disease disorder might benefit from a higher level neurological medication management.. Low-fat, low-cholesterol, low-salt diet with activities as tolerated for heart health encouraged.  Follow-up with you for care and see me in 3 months or sooner if needed.

## 2020-12-08 NOTE — REASON FOR VISIT
[Follow-Up - Clinic] : a clinic follow-up of [Abnormal ECG] : an abnormal ECG [Dizziness] : dizziness [Hyperlipidemia] : hyperlipidemia [Hypertension] : hypertension [Palpitations] : palpitations

## 2020-12-22 ENCOUNTER — RX CHANGE (OUTPATIENT)
Age: 70
End: 2020-12-22

## 2021-02-04 ENCOUNTER — APPOINTMENT (OUTPATIENT)
Dept: NEUROLOGY | Facility: CLINIC | Age: 71
End: 2021-02-04
Payer: MEDICARE

## 2021-02-04 VITALS
BODY MASS INDEX: 19.67 KG/M2 | HEART RATE: 82 BPM | HEIGHT: 63 IN | DIASTOLIC BLOOD PRESSURE: 84 MMHG | WEIGHT: 111 LBS | SYSTOLIC BLOOD PRESSURE: 131 MMHG

## 2021-02-04 VITALS — TEMPERATURE: 96.7 F

## 2021-02-04 PROCEDURE — 99072 ADDL SUPL MATRL&STAF TM PHE: CPT

## 2021-02-04 PROCEDURE — 99203 OFFICE O/P NEW LOW 30 MIN: CPT

## 2021-02-04 NOTE — HISTORY OF PRESENT ILLNESS
[FreeTextEntry1] : I had the pleasure of seeing Ms. VÍCTOR ECKERT in the office today.  She is a 70 year right-handed patient who was referred for neurologic evaluation at your kind suggestion.  She was accompanied to the office by her grandson Domenico who assisted with the history.\par \par Mrs. Breaux has been experiencing tremors and gait difficulties probably for less than 5 years.  She has a very prominent right hand tremor particularly at rest.  She complains of difficulty walking.  She has major sleep difficulties often screaming out during her dreams.  She suffers from chronic low back pain and blurry vision.\par \par She recalls that her mother suffered from tremor in her 80s.  There are no other family members who suffer with tremor.\par \par She was evaluated by a neurologist Dr. Anupam Song.  An MRI of the brain revealed chronic ischemic changes.  There was generalized cerebral atrophy with involvement of the medial temporal lobes.\par \par A body FDG PET scan revealed right upper lobe faint metabolic activity.  CT surveillance was recommended.  MRI of the lumbar spine revealed degenerative changes with several levels of mild spinal stenosis.\par \par An accurate updated list of medications was not available.\par \par \par

## 2021-02-04 NOTE — PHYSICAL EXAM
[FreeTextEntry1] : Constitutional:  Patient was well-developed, well-nourished and in no acute distress. \par \par Head:  Normocephalic, atraumatic. Tympanic membranes were clear. \par \par Neck:  Supple with full range of motion. \par \par Cardiovascular:  Cardiac rhythm was regular without murmur. There were no carotid bruits. Peripheral pulses were full and symmetric. \par \par Respiratory:  Lungs were clear. \par \par Abdomen:  Soft and nontender. \par \par Spine:  Nontender. \par \par Skin:  There were no rashes. \par \par NEUROLOGICAL EXAMINATION:\par \par Mental Status: Patient was alert and oriented. Speech was fluent. There was no dysarthria.  She was mildly hypophonic.  Handwriting was not micrographic.\par \par Cranial Nerves: \par \par II: Pupils were surgical but reactive.  She could finger count bilaterally. Visual fields were full. Funduscopic examination was normal. \par \par III, IV, VI:  Eye movements were full without nystagmus. \par \par V: Facial sensation was intact. \par \par VII: Facial strength was normal.  There was mild facial masking.\par \par VIII: Hearing was equal. \par \par IX, X: Palatal movement was normal. Phonation was normal. \par \par XI: Sternocleidomastoids and trapezii were normal. \par \par XII: Tongue was midline but movements slow. There was no lingual atrophy or fasciculations. \par \par Motor Examination: There were prominent rest tremors in her right hand and leg.  There was no postural or action tremor.  There were decreased fine finger and rapid alternating movements in the right more than left hand.  There was no cogwheel rigidity.\par \par Sensory Examination: Pinprick, vibration and joint position sense were intact. \par \par Reflexes: DTRs were 2 throughout. \par \par Plantar Responses: Plantar responses were flexor. \par \par Coordination/Cerebellar Function: There was no dysmetria on finger to nose or heel to shin testing. \par \par Gait/Stance: Posture was mildly stooped.  Strides were normal.  Romberg was negative.  Tandem was mildly unsteady.\par

## 2021-02-04 NOTE — ASSESSMENT
[FreeTextEntry1] : Mrs. Breaux is a 70-year-old with recent onset of tremor and gait difficulties.  She is bradykinetic, mildly masked and has prominent rest tremors in her right arm and leg.  Her presentation seems most consistent with idiopathic Parkinson's disease.\par \par To confirm this clinical impression, I suggested that she undergo a dopamine uptake scan.  If abnormal,   I will suggest treatment with Sinemet.  She might benefit from physical therapy as well.  Further management will depend upon her test results and clinical course.  I suggested close telephone and office follow-up.

## 2021-02-04 NOTE — REVIEW OF SYSTEMS
[Difficulty Walking] : difficulty walking [Sleep Disturbances] : sleep disturbances [Negative] : Heme/Lymph

## 2021-02-04 NOTE — CONSULT LETTER
[Dear  ___] : Dear  [unfilled], [Consult Letter:] : I had the pleasure of evaluating your patient, [unfilled]. [Please see my note below.] : Please see my note below. [Consult Closing:] : Thank you very much for allowing me to participate in the care of this patient.  If you have any questions, please do not hesitate to contact me. [Sincerely,] : Sincerely, [FreeTextEntry3] : Prabhakar Buitrago MD\par

## 2021-03-02 ENCOUNTER — APPOINTMENT (OUTPATIENT)
Dept: NUCLEAR MEDICINE | Facility: IMAGING CENTER | Age: 71
End: 2021-03-02
Payer: MEDICARE

## 2021-03-02 ENCOUNTER — OUTPATIENT (OUTPATIENT)
Dept: OUTPATIENT SERVICES | Facility: HOSPITAL | Age: 71
LOS: 1 days | End: 2021-03-02
Payer: MEDICARE

## 2021-03-02 ENCOUNTER — RESULT REVIEW (OUTPATIENT)
Age: 71
End: 2021-03-02

## 2021-03-02 DIAGNOSIS — Z90.49 ACQUIRED ABSENCE OF OTHER SPECIFIED PARTS OF DIGESTIVE TRACT: Chronic | ICD-10-CM

## 2021-03-02 DIAGNOSIS — H26.40 UNSPECIFIED SECONDARY CATARACT: Chronic | ICD-10-CM

## 2021-03-02 DIAGNOSIS — G20 PARKINSON'S DISEASE: ICD-10-CM

## 2021-03-02 PROCEDURE — 78803 RP LOCLZJ TUM SPECT 1 AREA: CPT | Mod: 26

## 2021-03-02 PROCEDURE — A9584: CPT

## 2021-03-02 PROCEDURE — 78803 RP LOCLZJ TUM SPECT 1 AREA: CPT

## 2021-03-03 ENCOUNTER — NON-APPOINTMENT (OUTPATIENT)
Age: 71
End: 2021-03-03

## 2021-03-09 ENCOUNTER — NON-APPOINTMENT (OUTPATIENT)
Age: 71
End: 2021-03-09

## 2021-03-09 ENCOUNTER — APPOINTMENT (OUTPATIENT)
Dept: CARDIOLOGY | Facility: CLINIC | Age: 71
End: 2021-03-09
Payer: MEDICARE

## 2021-03-09 VITALS
HEIGHT: 63 IN | BODY MASS INDEX: 18.96 KG/M2 | TEMPERATURE: 98 F | WEIGHT: 107 LBS | DIASTOLIC BLOOD PRESSURE: 80 MMHG | SYSTOLIC BLOOD PRESSURE: 140 MMHG | HEART RATE: 74 BPM

## 2021-03-09 PROCEDURE — 93000 ELECTROCARDIOGRAM COMPLETE: CPT

## 2021-03-09 PROCEDURE — 99214 OFFICE O/P EST MOD 30 MIN: CPT

## 2021-03-09 PROCEDURE — 99072 ADDL SUPL MATRL&STAF TM PHE: CPT

## 2021-03-09 RX ORDER — PROPRANOLOL HYDROCHLORIDE 20 MG/1
20 TABLET ORAL TWICE DAILY
Qty: 180 | Refills: 2 | Status: DISCONTINUED | COMMUNITY
Start: 2020-10-28 | End: 2021-03-09

## 2021-03-09 NOTE — DISCUSSION/SUMMARY
[___ Month(s)] : [unfilled] month(s) [FreeTextEntry1] : She will continue on aspirin 81 mg daily and simvastatin 10 mg daily for lipid lowering and cardiovascular risk reduction.  Continue adherence to lisinopril 10 mg daily for low-dose antihypertensive also to be continued.  I have renewed the above medications today.  Age-appropriate activities as tolerated with care along with low-fat low-cholesterol low-salt diet also encouraged.  I have given her a COVID-19 vaccination eligibility letter today as well.  She will follow up with neurology in the near term and see me in about 6 months or sooner if needed.

## 2021-03-09 NOTE — HISTORY OF PRESENT ILLNESS
[FreeTextEntry1] : Quiana is 71 years of age with dyslipidemia, hypertension and essential tremor for which neurologic work-up appears to have diagnosed Parkinson's disease and she will have upcoming neurology follow-up to initiate medical management and treatments next month.  She is not having any benefit from propranolol and this was discontinued on account of side effects of dizziness.  No current chest complaints.  She needs renewal of her other medications.

## 2021-04-01 ENCOUNTER — APPOINTMENT (OUTPATIENT)
Dept: NEUROLOGY | Facility: CLINIC | Age: 71
End: 2021-04-01
Payer: MEDICARE

## 2021-04-01 VITALS
DIASTOLIC BLOOD PRESSURE: 82 MMHG | SYSTOLIC BLOOD PRESSURE: 140 MMHG | HEART RATE: 80 BPM | WEIGHT: 106 LBS | HEIGHT: 63 IN | BODY MASS INDEX: 18.78 KG/M2

## 2021-04-01 VITALS — TEMPERATURE: 97.9 F

## 2021-04-01 PROCEDURE — 99072 ADDL SUPL MATRL&STAF TM PHE: CPT

## 2021-04-01 PROCEDURE — 99213 OFFICE O/P EST LOW 20 MIN: CPT

## 2021-04-01 RX ORDER — CARBIDOPA AND LEVODOPA 25; 100 MG/1; MG/1
25-100 TABLET ORAL 3 TIMES DAILY
Qty: 540 | Refills: 1 | Status: ACTIVE | COMMUNITY
Start: 2021-04-01 | End: 1900-01-01

## 2021-04-01 RX ORDER — ALPRAZOLAM 0.25 MG/1
0.25 TABLET ORAL
Qty: 30 | Refills: 1 | Status: DISCONTINUED | COMMUNITY
Start: 2020-12-08 | End: 2021-04-01

## 2021-04-01 NOTE — ASSESSMENT
[FreeTextEntry1] : Ms. Breaux is a 71-year-old who suffers from idiopathic Parkinson's disease and possibly a REM behavioral disorder.  She is very troubled by her tremor.  I suggested treatment with incremental doses of Sinemet 25/100.  A titration schedule was provided.  I also strongly recommended a formal sleep evaluation to define the nature of her sleep disorder and thereby allow appropriate treatment.  She will return to the office in 3 months for routine follow-up.  Telephone contact will be maintained in the meantime.

## 2021-04-01 NOTE — HISTORY OF PRESENT ILLNESS
[FreeTextEntry1] : Ms. VÍCTOR ECKERT returned to the office having been initially evaluated on February 4, 2021.  She is a 71-year-old right-handed patient who complained of tremors and gait difficulties of less than 5 years duration.  She had a prominent right hand rest tremor.  She also had nocturnal symptoms suggestive of a REM behavioral disorder.  Her mother suffered from a tremor later in life.  Her examination and history were worrisome for idiopathic Parkinson's disease.  An MRI of the brain revealed chronic ischemic changes and atrophy.\par \par Since last seen she underwent a dopamine uptake scan which was grossly abnormal and consistent with idiopathic Parkinson's disease.

## 2021-07-06 ENCOUNTER — RX RENEWAL (OUTPATIENT)
Age: 71
End: 2021-07-06

## 2021-09-14 ENCOUNTER — NON-APPOINTMENT (OUTPATIENT)
Age: 71
End: 2021-09-14

## 2021-09-14 ENCOUNTER — APPOINTMENT (OUTPATIENT)
Dept: CARDIOLOGY | Facility: CLINIC | Age: 71
End: 2021-09-14
Payer: MEDICARE

## 2021-09-14 VITALS
OXYGEN SATURATION: 91 % | BODY MASS INDEX: 17.72 KG/M2 | DIASTOLIC BLOOD PRESSURE: 70 MMHG | HEIGHT: 63 IN | WEIGHT: 100 LBS | SYSTOLIC BLOOD PRESSURE: 122 MMHG | HEART RATE: 79 BPM

## 2021-09-14 DIAGNOSIS — Z86.39 PERSONAL HISTORY OF OTHER ENDOCRINE, NUTRITIONAL AND METABOLIC DISEASE: ICD-10-CM

## 2021-09-14 DIAGNOSIS — Z86.79 PERSONAL HISTORY OF OTHER DISEASES OF THE CIRCULATORY SYSTEM: ICD-10-CM

## 2021-09-14 PROCEDURE — 99213 OFFICE O/P EST LOW 20 MIN: CPT

## 2021-09-14 PROCEDURE — 93000 ELECTROCARDIOGRAM COMPLETE: CPT

## 2021-09-14 RX ORDER — ASPIRIN ENTERIC COATED TABLETS 81 MG 81 MG/1
81 TABLET, DELAYED RELEASE ORAL DAILY
Qty: 90 | Refills: 3 | Status: DISCONTINUED | COMMUNITY
Start: 2021-03-09 | End: 2021-09-14

## 2021-09-14 RX ORDER — PROPRANOLOL HYDROCHLORIDE 10 MG/1
10 TABLET ORAL
Qty: 180 | Refills: 2 | Status: DISCONTINUED | COMMUNITY
Start: 2021-07-06 | End: 2021-09-14

## 2021-09-15 PROBLEM — Z86.79 HISTORY OF ABNORMAL ELECTROCARDIOGRAPHY: Status: RESOLVED | Noted: 2021-09-15 | Resolved: 2021-09-15

## 2021-09-15 PROBLEM — Z86.39 HISTORY OF HYPERLIPIDEMIA: Status: RESOLVED | Noted: 2021-03-09 | Resolved: 2021-09-15

## 2021-09-15 LAB
25(OH)D3 SERPL-MCNC: 38 NG/ML
ALBUMIN SERPL ELPH-MCNC: 4.3 G/DL
ALP BLD-CCNC: 55 U/L
ALT SERPL-CCNC: 6 U/L
ANION GAP SERPL CALC-SCNC: 14 MMOL/L
AST SERPL-CCNC: 19 U/L
BASOPHILS # BLD AUTO: 0.04 K/UL
BASOPHILS NFR BLD AUTO: 0.8 %
BILIRUB SERPL-MCNC: 0.4 MG/DL
BUN SERPL-MCNC: 17 MG/DL
CALCIUM SERPL-MCNC: 9.1 MG/DL
CHLORIDE SERPL-SCNC: 103 MMOL/L
CHOLEST SERPL-MCNC: 209 MG/DL
CO2 SERPL-SCNC: 24 MMOL/L
CREAT SERPL-MCNC: 0.73 MG/DL
EOSINOPHIL # BLD AUTO: 0.02 K/UL
EOSINOPHIL NFR BLD AUTO: 0.4 %
GLUCOSE SERPL-MCNC: 73 MG/DL
HCT VFR BLD CALC: 40.2 %
HDLC SERPL-MCNC: 81 MG/DL
HGB BLD-MCNC: 12.2 G/DL
IMM GRANULOCYTES NFR BLD AUTO: 0 %
LDLC SERPL CALC-MCNC: 113 MG/DL
LYMPHOCYTES # BLD AUTO: 2.01 K/UL
LYMPHOCYTES NFR BLD AUTO: 38 %
MAN DIFF?: NORMAL
MCHC RBC-ENTMCNC: 28.3 PG
MCHC RBC-ENTMCNC: 30.3 GM/DL
MCV RBC AUTO: 93.3 FL
MONOCYTES # BLD AUTO: 0.22 K/UL
MONOCYTES NFR BLD AUTO: 4.2 %
NEUTROPHILS # BLD AUTO: 3 K/UL
NEUTROPHILS NFR BLD AUTO: 56.6 %
NONHDLC SERPL-MCNC: 128 MG/DL
PLATELET # BLD AUTO: 211 K/UL
POTASSIUM SERPL-SCNC: 4.3 MMOL/L
PROT SERPL-MCNC: 6.9 G/DL
RBC # BLD: 4.31 M/UL
RBC # FLD: 13.3 %
SODIUM SERPL-SCNC: 140 MMOL/L
T3FREE SERPL-MCNC: 2.41 PG/ML
T4 FREE SERPL-MCNC: 1.2 NG/DL
TRIGL SERPL-MCNC: 74 MG/DL
TSH SERPL-ACNC: 2.73 UIU/ML
WBC # FLD AUTO: 5.29 K/UL

## 2021-09-15 NOTE — CARDIOLOGY SUMMARY
[de-identified] : Marked sinus  Bradycardia  -Short HI.\par Mayank = 92\par -  Negative precordial T-waves. \par \par BORDERLINE RHYTHM

## 2021-09-15 NOTE — HISTORY OF PRESENT ILLNESS
[FreeTextEntry1] : Quiana is a pleasant 71-year-old with hypertension, dyslipidemia, Parkinson's disease and tremor for which symptoms seem to have improved but she has memory disturbance.  She is lost some weight and her appetite is down.  She also is having sleeping difficulty.  Marginal blood pressure seen but no current chest pains, shortness of breath, palpitations or edema noted.  I reviewed available lab and cardiac data.

## 2021-09-15 NOTE — DISCUSSION/SUMMARY
[___ Month(s)] : in [unfilled] month(s) [FreeTextEntry1] : She will continue on simvastatin for lipid lowering and I am lowering her lisinopril dosing 5 mg daily which I renewed.  I have highly encouraged follow-up with neurology as it seems there was an outstanding sleep evaluation and perhaps she needs adjustment of her Parkinson's medications.  I suggested the use of meal replacement supplements such as Glucerna or boost.  Follow-up in about 3 months or sooner if needed.

## 2021-10-26 ENCOUNTER — APPOINTMENT (OUTPATIENT)
Dept: NEUROLOGY | Facility: CLINIC | Age: 71
End: 2021-10-26
Payer: MEDICARE

## 2021-10-26 VITALS
DIASTOLIC BLOOD PRESSURE: 78 MMHG | HEIGHT: 63 IN | WEIGHT: 101 LBS | HEART RATE: 75 BPM | SYSTOLIC BLOOD PRESSURE: 130 MMHG | BODY MASS INDEX: 17.89 KG/M2

## 2021-10-26 PROCEDURE — 99213 OFFICE O/P EST LOW 20 MIN: CPT

## 2021-10-26 NOTE — ASSESSMENT
[FreeTextEntry1] : Ms. Breaux is a 71-year-old who suffers from idiopathic Parkinson's disease.  It appears that she has lifelong anxiety and depression but has been resistant to psychiatric intervention.  She will has a significant sleep disorder as well.  Her Parkinson symptoms appear to pale compared to these issues.\par \par I strongly suggested psychiatric and sleep disorder evaluations.  Since Sinemet has not improved her motor symptoms, I suggested decreasing the daily dose to 1 tablet 3 times a day.  She may continue physical therapy.  I suggested close communication possibly through the "[employer]" portal.  Further management will depend upon her clinical course.

## 2021-10-26 NOTE — CONSULT LETTER
[Dear  ___] : Dear  [unfilled], [Consult Letter:] : I had the pleasure of evaluating your patient, [unfilled]. [Please see my note below.] : Please see my note below. [Consult Closing:] : Thank you very much for allowing me to participate in the care of this patient.  If you have any questions, please do not hesitate to contact me. [Sincerely,] : Sincerely, [FreeTextEntry3] : Prabhakar Buitrago MD\par  [DrTuan  ___] : Dr. JUAREZ

## 2021-10-26 NOTE — PHYSICAL EXAM
[FreeTextEntry1] : Constitutional:  Patient was well-developed, well-nourished and in no acute distress. \par \par Head:  Normocephalic, atraumatic. Tympanic membranes were clear. \par \par Neck:  Supple with full range of motion. \par \par Cardiovascular:  Cardiac rhythm was regular without murmur. There were no carotid bruits. Peripheral pulses were full and symmetric. \par \par Respiratory:  Lungs were clear. \par \par Abdomen:  Soft and nontender. \par \par Spine:  Nontender. \par \par Skin:  There were no rashes. \par \par NEUROLOGICAL EXAMINATION:\par \par Mental Status: Patient was alert and oriented. Speech was fluent. There was no dysarthria.  She was mildly hypophonic.  She was not actively hallucinating.\par \par Cranial Nerves: \par \par II: Pupils were surgical but reactive.  She could finger count bilaterally. Visual fields were full. Funduscopic examination was normal. \par \par III, IV, VI:  Eye movements were full without nystagmus. \par \par V: Facial sensation was intact. \par \par VII: Facial strength was normal.  There was mild facial masking.\par \par VIII: Hearing was equal. \par \par IX, X: Palatal movement was normal. Phonation was normal. \par \par XI: Sternocleidomastoids and trapezii were normal. \par \par XII: Tongue was midline but movements slow. There was no lingual atrophy or fasciculations. \par \par Motor Examination: There were prominent rest tremors in her right hand and leg.  There was no postural or action tremor.  There were decreased fine finger and rapid alternating movements in the right more than left hand.  There was no cogwheel rigidity.\par \par Sensory Examination: Pinprick, vibration and joint position sense were intact. \par \par Reflexes: DTRs were 2 throughout. \par \par Plantar Responses: Plantar responses were flexor. \par \par Coordination/Cerebellar Function: There was no dysmetria on finger to nose or heel to shin testing. \par \par Gait/Stance: Posture was mildly stooped.  Strides were normal.  Romberg was negative.  Tandem was mildly unsteady.\par

## 2021-10-26 NOTE — HISTORY OF PRESENT ILLNESS
[FreeTextEntry1] : Ms. VÍCTOR ECKERT returned to the office having been last evaluated on April 1, 2021.  She is a 71-year-old right-handed patient who complained of tremors and gait difficulties of less than 5 years duration.  She had a prominent right hand rest tremor.  She also had nocturnal symptoms suggestive of a REM behavioral disorder.  Her mother suffered from a tremor later in life.  Her examination and history were worrisome for idiopathic Parkinson's disease.  An MRI of the brain revealed chronic ischemic changes and atrophy.  Dopamine uptake scan was abnormal and consistent with idiopathic Parkinson's disease.\par \par Since last seen, she was begun on Sinemet 25/100.  She takes between 4 and 7 tablets/day according to her daughter Radha ([phone]).  This has had no significant impact on her right sided tremor.  Her gait is quite stable.\par \par I obtained additional history from Radha.  Her mother has suffered from anxiety and depression for many years.  She was previously under psychiatric care.  She is resistant to psychotropic medications.  Her granddaughter feels the same way.\par \par Radha reports that she sleeps during the day and is unable to sleep at night.  She has frequent anxiety attacks.  She is delusional and sometimes sees faces.  She is oriented but apathetic.  She is home alone all day.  She undergoes physical therapy twice a week.  She did not pursue a sleep disorder evaluation.

## 2021-12-14 ENCOUNTER — APPOINTMENT (OUTPATIENT)
Dept: CARDIOLOGY | Facility: CLINIC | Age: 71
End: 2021-12-14
Payer: MEDICARE

## 2021-12-14 ENCOUNTER — NON-APPOINTMENT (OUTPATIENT)
Age: 71
End: 2021-12-14

## 2021-12-14 VITALS
WEIGHT: 100 LBS | SYSTOLIC BLOOD PRESSURE: 126 MMHG | BODY MASS INDEX: 17.72 KG/M2 | DIASTOLIC BLOOD PRESSURE: 78 MMHG | HEIGHT: 63 IN

## 2021-12-14 DIAGNOSIS — G25.0 ESSENTIAL TREMOR: ICD-10-CM

## 2021-12-14 DIAGNOSIS — E78.5 HYPERLIPIDEMIA, UNSPECIFIED: ICD-10-CM

## 2021-12-14 DIAGNOSIS — R94.31 ABNORMAL ELECTROCARDIOGRAM [ECG] [EKG]: ICD-10-CM

## 2021-12-14 DIAGNOSIS — I10 ESSENTIAL (PRIMARY) HYPERTENSION: ICD-10-CM

## 2021-12-14 PROCEDURE — 93000 ELECTROCARDIOGRAM COMPLETE: CPT

## 2021-12-14 PROCEDURE — 99214 OFFICE O/P EST MOD 30 MIN: CPT

## 2021-12-14 NOTE — DISCUSSION/SUMMARY
[___ Month(s)] : in [unfilled] month(s) [FreeTextEntry1] : I have made no changes to her medications and she will continue on lisinopril for blood pressure management and simvastatin for lipid lowering.  I taken the liberty of strongly encouraging psychiatric/psychology counseling and given her suggestion on several referral options.  She will follow up with neurology as well.  No cardiac testing indicated at present.  Previous echo showed preserved LV systolic function and no significant valvular heart disease.  I have also suggested nutritional support with ensures.  Follow-up with you for care and see me in 6 months or sooner if needed.

## 2021-12-14 NOTE — CARDIOLOGY SUMMARY
[de-identified] : Essential tremor improved interpretation.\par atrial  Rhythm  -with ectopic ventricular couplets \par P:QRS - 1:1, Abnormal P axis, H Rate 71\par -  Negative precordial T-waves. \par \par ABNORMAL RHYTHM\par

## 2021-12-14 NOTE — HISTORY OF PRESENT ILLNESS
[FreeTextEntry1] : Quiana is 71 years of age with Parkinson's disease and tremor, dyslipidemia, hypertension, memory disturbance, mood disorder and weight loss coming back today for cardiac checkup.  No active chest symptoms reported.  She was suggested previously for psychiatric evaluation but has declined.  She is having difficulty with appetite and self-care.  Sounds like she is having possible hallucinations as well.  She reports that her family is trying to access caregiver support.

## 2022-02-28 ENCOUNTER — NON-APPOINTMENT (OUTPATIENT)
Age: 72
End: 2022-02-28

## 2022-02-28 RX ORDER — CARBIDOPA AND LEVODOPA 25; 250 MG/1; MG/1
25-250 TABLET ORAL 3 TIMES DAILY
Qty: 270 | Refills: 3 | Status: ACTIVE | COMMUNITY
Start: 2022-02-28 | End: 1900-01-01

## 2022-03-16 ENCOUNTER — NON-APPOINTMENT (OUTPATIENT)
Age: 72
End: 2022-03-16

## 2022-03-25 ENCOUNTER — NON-APPOINTMENT (OUTPATIENT)
Age: 72
End: 2022-03-25

## 2022-04-25 ENCOUNTER — APPOINTMENT (OUTPATIENT)
Dept: NEUROLOGY | Facility: CLINIC | Age: 72
End: 2022-04-25
Payer: MEDICARE

## 2022-04-25 VITALS
HEIGHT: 63 IN | BODY MASS INDEX: 18.07 KG/M2 | DIASTOLIC BLOOD PRESSURE: 73 MMHG | SYSTOLIC BLOOD PRESSURE: 110 MMHG | WEIGHT: 102 LBS | HEART RATE: 73 BPM

## 2022-04-25 DIAGNOSIS — G47.9 SLEEP DISORDER, UNSPECIFIED: ICD-10-CM

## 2022-04-25 PROCEDURE — 99213 OFFICE O/P EST LOW 20 MIN: CPT

## 2022-04-25 NOTE — PHYSICAL EXAM
[FreeTextEntry1] : Constitutional:  Patient was well-developed, well-nourished and in no acute distress. \par \par Head:  Normocephalic, atraumatic. Tympanic membranes were clear. \par \par Neck:  Supple with full range of motion. \par \par Cardiovascular:  Cardiac rhythm was regular without murmur. There were no carotid bruits. Peripheral pulses were full and symmetric. \par \par Respiratory:  Lungs were clear. \par \par Abdomen:  Soft and nontender. \par \par Spine:  Nontender. \par \par Skin:  There were no rashes. \par \par NEUROLOGICAL EXAMINATION:\par \par Mental Status: Patient was alert and oriented. Speech was fluent. There was no dysarthria.  She was mildly hypophonic.  She was not actively hallucinating.\par \par Cranial Nerves: \par \par II: Pupils were surgical but reactive.  She could finger count bilaterally. Visual fields were full. Funduscopic examination was normal. \par \par III, IV, VI:  Eye movements were full without nystagmus. \par \par V: Facial sensation was intact. \par \par VII: Facial strength was normal.  There was mild facial masking.\par \par VIII: Hearing was equal. \par \par IX, X: Palatal movement was normal. Phonation was normal. \par \par XI: Sternocleidomastoids and trapezii were normal. \par \par XII: Tongue was midline but movements slow. There was no lingual atrophy or fasciculations. \par \par Motor Examination: There was a prominent rest tremor in her right hand.  There was no postural or action tremor.  There were decreased fine finger and rapid alternating movements in the right more than left hand.  There was no cogwheel rigidity.\par \par Sensory Examination: Pinprick, vibration and joint position sense were intact. \par \par Reflexes: DTRs were 2 throughout. \par \par Plantar Responses: Plantar responses were flexor. \par \par Coordination/Cerebellar Function: There was no dysmetria on finger to nose or heel to shin testing. \par \par Gait/Stance: Posture was mildly stooped.  Strides were normal.  Romberg was negative.  Tandem was mildly unsteady.\par

## 2022-04-25 NOTE — ASSESSMENT
[FreeTextEntry1] : Ms. Breaux is a 72-year-old who suffers from idiopathic Parkinson's disease, depression and anxiety which has been resistant to psychiatric intervention.  She also has a sleep disorder.  She denies any significant positive response to use of Sinemet.  She is scheduled to see Dr. Rodriguez, a movement disorder specialist in early June.  I look forward to her input.  I provided her with a prescription for a straight cane for physical therapy specifically for Parkinson's disease.  Further management will depend upon her clinical course.

## 2022-04-25 NOTE — CONSULT LETTER
[Dear  ___] : Dear  [unfilled], [Consult Letter:] : I had the pleasure of evaluating your patient, [unfilled]. [Please see my note below.] : Please see my note below. [Consult Closing:] : Thank you very much for allowing me to participate in the care of this patient.  If you have any questions, please do not hesitate to contact me. [Sincerely,] : Sincerely, [FreeTextEntry3] : Prabhakar Buitrago MD\par  [DrTuan  ___] : Dr. UJAREZ

## 2022-04-25 NOTE — HISTORY OF PRESENT ILLNESS
[FreeTextEntry1] : Ms. VÍCTOR ECKERT returned to the office having been last evaluated on October 26, 2021.  She is a 72-year-old right-handed patient who complained of tremors and gait difficulties of less than 5 years duration.  She had a prominent right hand rest tremor.  She also had nocturnal symptoms suggestive of a REM behavioral disorder.  Her mother suffered from a tremor later in life.  Her examination and history were worrisome for idiopathic Parkinson's disease.  An MRI of the brain revealed chronic ischemic changes and atrophy.  Dopamine uptake scan was abnormal and consistent with idiopathic Parkinson's disease.\par \par According to her daughter Henna, she is now on Sinemet 25/100 1 tablet 3 times a day.  Higher doses were not beneficial.  She is undergoing physical therapy twice weekly for back pain.  According to her aide Georgina Reyes, she complains of dizziness, blurred vision, anxiety, tremulousness, difficulty sleeping and imbalance.  She is not using a walking aid.\par \par She has a long history of anxiety and depression.  She was previously under psychiatric care.  She is resistant to psychotropic medications.  \par \par Medications include Sinemet 25/100, lisinopril and simvastatin.  Her internist prescribed something for her nerves which she takes at night.\par

## 2022-06-03 ENCOUNTER — APPOINTMENT (OUTPATIENT)
Dept: NEUROLOGY | Facility: CLINIC | Age: 72
End: 2022-06-03
Payer: MEDICARE

## 2022-06-03 VITALS — HEART RATE: 74 BPM | DIASTOLIC BLOOD PRESSURE: 82 MMHG | SYSTOLIC BLOOD PRESSURE: 124 MMHG

## 2022-06-03 VITALS
SYSTOLIC BLOOD PRESSURE: 137 MMHG | RESPIRATION RATE: 15 BRPM | BODY MASS INDEX: 18.25 KG/M2 | HEIGHT: 63 IN | HEART RATE: 70 BPM | DIASTOLIC BLOOD PRESSURE: 86 MMHG | WEIGHT: 103 LBS

## 2022-06-03 DIAGNOSIS — G20 PARKINSON'S DISEASE: ICD-10-CM

## 2022-06-03 PROCEDURE — 99215 OFFICE O/P EST HI 40 MIN: CPT

## 2022-06-03 RX ORDER — ESCITALOPRAM OXALATE 5 MG/1
5 TABLET ORAL
Qty: 90 | Refills: 3 | Status: ACTIVE | COMMUNITY
Start: 2022-06-03 | End: 1900-01-01

## 2022-06-03 NOTE — PHYSICAL EXAM
[FreeTextEntry1] : Patient is awake and alert.  She is pleasant cooperative with the exam.  Facial expression is minimally reduced\par Strength 5/5\par Extraocular movements are intact, face is symmetric tongue and uvula midline and symmetric\par \par Resting tremor\par 2 in the right upper extremity, 1 in the left upper extremity\par 3 in the right leg, 2 in the left leg\par Tremor was quite variable at times she had minimal right foot tremor but when patient got nervous the tremor increased\par \par Bilateral bradykinesia\par Hand opening and closing 2 on the right 1 on the left\par Rapid alternating movements 2 on the right 1 on the left\par Finger taps 2 on the right 1 on the left\par Leg agility is normal\par \par No difficulty getting up from the chair\par Good speed and stride of walking, good bilateral arm swing some reemergence of right hand tremor when the patient is standing\par Pull test-mild retropulsion, but patient is quite anxious while being tested

## 2022-06-03 NOTE — DISCUSSION/SUMMARY
[FreeTextEntry1] : This is a 72-year-old right-handed female who presents with chief complaints of Parkinson's disease diagnosed in 2021.  Her initial symptoms started in 2020 with right foot tremors and have slowly progressed over time.  DaTscan abnormal\par Patient also reports having cognitive changes, unsure for how long as she used to live in [place] at that time\par Reports having some hallucinations wherein she feels things are moving,?  If side effect from Sinemet versus Lewy body versus anxiety\par \par Plan\par I had an extensive discussion with the patient and her granddaughter.\par Recommended SSRI to help with anxiety, as that could be worsening her tremor.  She agrees to try Lexapro 5 mg daily.  Side effects discussed in detail\par If hallucinations worsen may need to consider reducing Sinemet or increasing quetiapine (which is prescribed by her primary care physician for sleep)\par She agrees to have neuropsych evaluation \par Had several questions all of which were addressed and answered\par She will continue to follow with Dr. Buitrago and with me as needed\par

## 2022-06-03 NOTE — HISTORY OF PRESENT ILLNESS
[FreeTextEntry1] : This is a 72-year-old right-handed female who presents with her granddaughter faustino.  Patient is kindly referred to the movement disorders clinic for a consultation.  History is obtained from the patient and her granddaughter\par \par Initially noticed tremor in the right foot while she was still living in [place] in 2020.  Tremor has progressed to involve the right arm and sometimes on the left as well.  Patient is not sure if Sinemet is helping a lot with her symptoms.  She is currently on Sinemet 250, 3 times a day at 10, 3 and before bed\par \par She is also reporting memory changes but it is unclear for how long they have been present.  Patient is forgetful she repeats herself.  She misplaces items.  She also reports having some hallucinations where she feels that things are moving.  She never sees people animal bugs etc. sometimes she is reaching for things or grabbing things that are not there.  Overall feels that these hallucinations have worsened over time\par \par She endorses having significant anxiety.\par \par She denies dysphagia to liquids.  Sometimes food gets stuck in her throat.\par She also reports having burning pain in her right leg and lower back region.  She states that this is chronic and in the past has tried gabapentin which was not helpful.\par \par She has difficulty sleeping and her primary care physician provided her with quetiapine.  Sometimes she wakes up at night due to the tremor\par \par Review of systems a complete review of systems is performed and is negative except as listed in HPI

## 2022-06-17 NOTE — ED ADULT NURSE NOTE - NS ED NOTE ABUSE SUSPICION NEGLECT YN
----- Message from Tiera Cedillo MD sent at 6/16/2022  2:16 PM CDT -----  Is he taking the cholesterol medication? If not, please start taking. If so, I can make a dose adjustment.    Hello    Normal thyroid function test    Prostate test within normal limits    Normal liver and kidney function testing    Elevated total cholesterol and LDL the bad cholesterol is also elevated.  Are you taking your cholesterol medication, atorvastatin?  Low cholesterol foods and exercise encouraged.    No diabetes    Mild anemia  Fortunately you are up to date on colonoscopy  You have anemia. This is a low hemoglobin in red blood cells (oxygen carrying cells). Encourage iron rich foods: , fortified cereals, grains, dried beans and vegetables. You may elect to take over the counter iron tablets.          No

## 2022-08-08 ENCOUNTER — TRANSCRIPTION ENCOUNTER (OUTPATIENT)
Age: 72
End: 2022-08-08

## 2022-11-15 ENCOUNTER — APPOINTMENT (OUTPATIENT)
Dept: NEUROLOGY | Facility: CLINIC | Age: 72
End: 2022-11-15

## 2022-11-15 VITALS
HEART RATE: 71 BPM | DIASTOLIC BLOOD PRESSURE: 89 MMHG | WEIGHT: 103 LBS | BODY MASS INDEX: 18.25 KG/M2 | SYSTOLIC BLOOD PRESSURE: 157 MMHG | HEIGHT: 63 IN

## 2022-11-15 DIAGNOSIS — G20 PARKINSON'S DISEASE: ICD-10-CM

## 2022-11-15 DIAGNOSIS — F32.A ANXIETY DISORDER, UNSPECIFIED: ICD-10-CM

## 2022-11-15 DIAGNOSIS — F41.9 ANXIETY DISORDER, UNSPECIFIED: ICD-10-CM

## 2022-11-15 PROCEDURE — 99214 OFFICE O/P EST MOD 30 MIN: CPT

## 2022-11-15 NOTE — PHYSICAL EXAM
[FreeTextEntry1] : Constitutional:  Patient was well-developed, well-nourished and in no acute distress. \par \par Head:  Normocephalic, atraumatic. Tympanic membranes were clear. \par \par Neck:  Supple with full range of motion. \par \par Cardiovascular:  Cardiac rhythm was regular without murmur. There were no carotid bruits. Peripheral pulses were full and symmetric. \par \par Respiratory:  Lungs were clear. \par \par Abdomen:  Soft and nontender. \par \par Spine:  Nontender. \par \par Skin:  There were no rashes. \par \par NEUROLOGICAL EXAMINATION:\par \par Mental Status: Patient was alert and oriented. Speech was fluent. There was no dysarthria.  She was mildly hypophonic.  She was not actively hallucinating.\par \par Cranial Nerves: \par \par II: Pupils were surgical but reactive.  She could finger count bilaterally. Visual fields were full. Funduscopic examination was normal. \par \par III, IV, VI:  Eye movements were full without nystagmus. \par \par V: Facial sensation was intact. \par \par VII: Facial strength was normal.  There was mild facial masking.\par \par VIII: Hearing was equal. \par \par IX, X: Palatal movement was normal. Phonation was normal. \par \par XI: Sternocleidomastoids and trapezii were normal. \par \par XII: Tongue was midline but movements slow. There was no lingual atrophy or fasciculations. \par \par Motor Examination: There was a prominent right hand rest tremor.  There was no postural or action tremor.  There were decreased fine finger and rapid alternating movements in the right more than left hand.  There was no cogwheel rigidity.\par \par Sensory Examination: Pinprick, vibration and joint position sense were intact. \par \par Reflexes: DTRs were 2 throughout. \par \par Plantar Responses: Plantar responses were flexor. \par \par Coordination/Cerebellar Function: There was no dysmetria on finger to nose or heel to shin testing. \par \par Gait/Stance: Posture was mildly stooped.  Strides were normal.  Romberg was negative.  Tandem was mildly unsteady.\par

## 2022-11-15 NOTE — REVIEW OF SYSTEMS
[Difficulty Walking] : difficulty walking [Sleep Disturbances] : sleep disturbances [Anxiety] : anxiety [Negative] : Heme/Lymph

## 2022-11-15 NOTE — HISTORY OF PRESENT ILLNESS
[FreeTextEntry1] : Ms. VÍCTOR ECKERT returned to the office having been last evaluated on April 25, 2022.  She is a 72-year-old right-handed patient who complained of tremors and gait difficulties of less than 5 years duration.  She had a prominent right hand rest tremor.  She also had nocturnal symptoms suggestive of a REM behavioral disorder.  Her mother suffered from a tremor later in life.  Her examination and history were worrisome for idiopathic Parkinson's disease.  An MRI of the brain revealed chronic ischemic changes and atrophy.  Dopamine uptake scan was abnormal and consistent with idiopathic Parkinson's disease.\par \par According to her daughter Henna ([phone]), her Sinemet dose was increased from 25/100 to 25/253 times a day by Dr. Rodriguez in June.  She was also begun on escitalopram 5 mg daily because of anxiety which was a lifelong problem.  Henna also informed me that she is on quetiapine 25 mg at bedtime which was begun by Dr. Clemons prior to her last appointment with me.  The quetiapine may be helping her sleep which is still a major issue.  She complains of right hand tremor.  She is undergoing physical therapy twice a week.\par \par Medications include Sinemet 25/250 1 tablet 3 times a day, quetiapine 25 mg at bedtime, escitalopram 5 mg/day and lisinopril.\par \par

## 2022-11-15 NOTE — ASSESSMENT
[FreeTextEntry1] : Ms. Breaux is a 72-year-old who suffers from idiopathic tremor predominant Parkinson's disease.  Her major problem; however, is lifelong anxiety which is poorly controlled.  She was recently begun on quetiapine at bedtime and escitalopram with dubious improvement.  I had a long discussion with Ms. Jauregui and Henna regarding her condition and treatment.  I believe that she would be best served by consulting with a geriatric psychiatrist.  Quetiapine, an atypical antipsychotic could potentially aggravate her parkinsonism but this does not appear to be her major problem but rather her anxiety.  She may continue her current dose of Sinemet 25/250.  Further management will depend upon clinical course.

## 2022-11-18 ENCOUNTER — NON-APPOINTMENT (OUTPATIENT)
Age: 72
End: 2022-11-18

## 2023-02-26 RX ORDER — CARBIDOPA AND LEVODOPA 25; 250 MG/1; MG/1
25-250 TABLET ORAL 3 TIMES DAILY
Qty: 270 | Refills: 3 | Status: ACTIVE | COMMUNITY
Start: 2023-02-26 | End: 1900-01-01

## 2023-07-21 ENCOUNTER — APPOINTMENT (OUTPATIENT)
Dept: NEUROLOGY | Facility: CLINIC | Age: 73
End: 2023-07-21
Payer: MEDICARE

## 2023-07-21 VITALS
BODY MASS INDEX: 17.36 KG/M2 | WEIGHT: 98 LBS | HEIGHT: 63 IN | SYSTOLIC BLOOD PRESSURE: 161 MMHG | HEART RATE: 72 BPM | DIASTOLIC BLOOD PRESSURE: 90 MMHG

## 2023-07-21 DIAGNOSIS — R42 DIZZINESS AND GIDDINESS: ICD-10-CM

## 2023-07-21 PROCEDURE — 99214 OFFICE O/P EST MOD 30 MIN: CPT

## 2023-07-21 NOTE — CONSULT LETTER
[Dear  ___] : Dear  [unfilled], [Consult Letter:] : I had the pleasure of evaluating your patient, [unfilled]. [Please see my note below.] : Please see my note below. [Consult Closing:] : Thank you very much for allowing me to participate in the care of this patient.  If you have any questions, please do not hesitate to contact me. [Sincerely,] : Sincerely, [DrTuan  ___] : Dr. JUAREZ [FreeTextEntry3] : Prabhakar Buitrago MD\par

## 2023-07-21 NOTE — PHYSICAL EXAM
[FreeTextEntry1] : Constitutional:  Patient was well-developed, well-nourished and in no acute distress. \par \par Head:  Normocephalic, atraumatic. Tympanic membranes were clear. \par \par Neck:  Supple with full range of motion. \par \par Cardiovascular:  Cardiac rhythm was regular without murmur. There were no carotid bruits. Peripheral pulses were full and symmetric. \par \par Respiratory:  Lungs were clear. \par \par Abdomen:  Soft and nontender. \par \par Spine:  Nontender. \par \par Skin:  There were no rashes. \par \par NEUROLOGICAL EXAMINATION:\par \par Mental Status: Patient was alert and poorly oriented. Speech was fluent. There was no dysarthria.  She was mildly hypophonic.  She was not actively hallucinating.  She scored 15 out of 30 on MMSE.  She was severely disoriented to time.  She had very poor attention span and recall.\par \par Cranial Nerves: \par \par II: Pupils were surgical but reactive.  She could finger count bilaterally. Visual fields were full.  Fundi were not visualized.\par \par III, IV, VI:  Eye movements were full without nystagmus. \par \par V: Facial sensation was intact. \par \par VII: Facial strength was normal.  There was mild facial masking.\par \par VIII: Hearing was equal. \par \par IX, X: Palatal movement was normal. Phonation was normal. \par \par XI: Sternocleidomastoids and trapezii were normal. \par \par XII: Tongue was midline but movements slow. There was no lingual atrophy or fasciculations. \par \par Motor Examination: There was a prominent right hand and foot rest tremor.  There was no postural or action tremor.  There were decreased fine finger and rapid alternating movements in the right more than left hand.  There was no cogwheel rigidity.\par \par Sensory Examination: Pinprick, vibration and joint position sense were intact. \par \par Reflexes: DTRs were 2 throughout. \par \par Plantar Responses: Plantar responses were flexor. \par \par Coordination/Cerebellar Function: There was no dysmetria on finger to nose or heel to shin testing. \par \par Gait/Stance: Posture was mildly stooped.  Strides were normal.  She had a mild dystonic movement of her right foot as she ambulated.  Romberg was negative.  Tandem was mildly unsteady.\par

## 2023-07-21 NOTE — ASSESSMENT
[FreeTextEntry1] : Ms. Breaux is a 73-year-old who suffers from idiopathic tremor predominant Parkinson's disease.  She is experiencing significant cognitive decline, sundowning and visual hallucinations.  This presentation is consistent with Parkinson's dementia complex.\par \par I suspect that her postural lightheadedness is due to dysautonomia and orthostatic hypotension.  I did not check her orthostatic blood pressure in the office but this should be pursued and treated accordingly.  I spoke to her granddaughter Henna ([phone]) by telephone about this issue.  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  She may continue Sinemet at its current dose and adjust the dose if needed.  Further management will depend upon her clinical course.

## 2023-07-21 NOTE — REVIEW OF SYSTEMS
[Dizziness] : dizziness [Difficulty Walking] : difficulty walking [Sleep Disturbances] : sleep disturbances [Anxiety] : anxiety [Negative] : Heme/Lymph

## 2023-07-21 NOTE — HISTORY OF PRESENT ILLNESS
[FreeTextEntry1] : Ms. VÍCTOR ECKERT returned to the office having been last evaluated on November 15, 2022.  She is a 73-year-old right-handed patient who complained of tremors and gait difficulties of less than 5 years duration.  She had a prominent right hand rest tremor.  She also had nocturnal symptoms suggestive of a REM behavioral disorder.  Her mother suffered from a tremor later in life.  Her examination and history were worrisome for idiopathic Parkinson's disease.  An MRI of the brain revealed chronic ischemic changes and atrophy.  Dopamine uptake scan was abnormal and consistent with idiopathic Parkinson's disease.\par \par She was accompanied to the office by her daughter Radha ([phone]).  Mrs. Eckert is still extremely anxious. She is particularly agitated at night and confrontational.  Her psychotropic regimen was doubled to Seroquel 50 mg and escitalopram 10 mg/day by Dr. Clemons.  She has not yet consulted with a psychiatrist.\par \par She complains of postural lightheadedness.  She has fallen but has not lost consciousness.  She has been experiencing visual hallucinations\par \par She underwent a surgical procedure for a pulmonary nodule recently at [facility] in [place]. She was referred to an oncologist.\par \par Medications include Sinemet 25/250 1-1/2 tablets 3 times a day, quetiapine 50 mg at bedtime and escitalopram.  Lisinopril was discontinued.\par \par

## 2024-01-08 RX ORDER — CARBIDOPA AND LEVODOPA 25; 250 MG/1; MG/1
25-250 TABLET ORAL
Qty: 405 | Refills: 3 | Status: ACTIVE | COMMUNITY
Start: 2024-01-08 | End: 1900-01-01

## 2024-01-09 RX ORDER — CARBIDOPA AND LEVODOPA 10; 100 MG/1; MG/1
10-100 TABLET ORAL
Qty: 1080 | Refills: 3 | Status: ACTIVE | COMMUNITY
Start: 2024-01-09 | End: 1900-01-01

## 2024-04-18 ENCOUNTER — APPOINTMENT (OUTPATIENT)
Dept: NEUROLOGY | Facility: CLINIC | Age: 74
End: 2024-04-18
Payer: MEDICARE

## 2024-04-18 VITALS
HEIGHT: 63 IN | SYSTOLIC BLOOD PRESSURE: 98 MMHG | DIASTOLIC BLOOD PRESSURE: 66 MMHG | BODY MASS INDEX: 18.07 KG/M2 | WEIGHT: 102 LBS | HEART RATE: 68 BPM

## 2024-04-18 DIAGNOSIS — G20.A1 PARKINSON'S DISEASE WITHOUT DYSKINESIA, WITHOUT MENTION OF FLUCTUATIONS: ICD-10-CM

## 2024-04-18 DIAGNOSIS — F02.818 PARKINSON'S DISEASE WITHOUT DYSKINESIA, WITHOUT MENTION OF FLUCTUATIONS: ICD-10-CM

## 2024-04-18 PROCEDURE — 99213 OFFICE O/P EST LOW 20 MIN: CPT

## 2024-04-18 RX ORDER — CARBIDOPA AND LEVODOPA 25; 250 MG/1; MG/1
25-250 TABLET ORAL 3 TIMES DAILY
Qty: 270 | Refills: 3 | Status: ACTIVE | COMMUNITY
Start: 2024-04-18 | End: 1900-01-01

## 2024-04-18 NOTE — PHYSICAL EXAM
[FreeTextEntry1] : Constitutional:  Patient was well-developed, well-nourished and in no acute distress.   Head:  Normocephalic, atraumatic. Tympanic membranes were not examined.   Neck:  Supple with full range of motion.   Cardiovascular:  Cardiac rhythm was regular without murmur. There were no carotid bruits. Peripheral pulses were full and symmetric.  Blood pressure was 110/70 seated and it dropped to 90 by palpation erect.  She was asymptomatic.  Respiratory:  Lungs were clear.   Abdomen:  Soft and nontender.   Spine:  Nontender.   Skin:  There were no rashes.   NEUROLOGICAL EXAMINATION:  Mental Status: Patient was alert and poorly oriented. Speech was fluent. There was no dysarthria.  She was mildly hypophonic.  She was not actively hallucinating.,  She again scored 15 out of 30 on MMSE.  She was severely disoriented, had poor attention and memory.  Cranial Nerves:   II: Pupils were surgical but reactive.  She could finger count bilaterally. Visual fields were full.  Fundi were not visualized.  III, IV, VI:  Eye movements were full without nystagmus.   V: Facial sensation was intact.   VII: Facial strength was normal.  There was mild facial masking.  VIII: Hearing was equal.   IX, X: Palatal movement was normal. Phonation was normal.   XI: Sternocleidomastoids and trapezii were normal.   XII: Tongue was midline but movements slow. There was no lingual atrophy or fasciculations.   Motor Examination: She was bradykinetic without tremor.  Fine finger movements were slow.  Sensory Examination: Pinprick and vibration were intact.   Reflexes: DTRs were 2 throughout.   Plantar Responses: Plantar responses were flexor.   Coordination/Cerebellar Function: There was no dysmetria on finger to nose or heel to shin testing.   Gait/Stance: Posture was mildly stooped.  Strides were normal.  She had a mild dystonic movement of her right foot as she ambulated.  Romberg was negative.  Tandem was mildly unsteady.

## 2024-04-18 NOTE — HISTORY OF PRESENT ILLNESS
[FreeTextEntry1] : Ms. VÍCTOR ECKERT returned to the office having been last evaluated on July 21, 2023.  She is a 74-year-old right-handed patient who complained of tremors and gait difficulties of less than 5 years duration.  She had a prominent right hand rest tremor.  She also had nocturnal symptoms suggestive of a REM behavioral disorder.  Her mother suffered from a tremor later in life.  Her examination and history were worrisome for idiopathic Parkinson's disease.  An MRI of the brain revealed chronic ischemic changes and atrophy.  Dopamine uptake scan was abnormal and consistent with idiopathic Parkinson's disease.  She was accompanied to the office by her daughter Radha ([phone]).  Mrs. Breaux resides with her daughter and 2 grandchildren.  She is confused, restless at night, hallucinates and has a poor memory.  She is able to feed herself but requires direction with bathing and dressing.  To date, her toileting has been good.  She has a Medicaid provided aide 45 hours/week.  The family provides care at all other times.  She ambulates independently.  Medications include Sinemet 25/250 1-1/2 tablets 3 times a day, atorvastatin, quetiapine 50 mg at bedtime and escitalopram.  She is now on midodrine 5 mg twice a day for postural hypotension.  About a year ago, she underwent resection of a malignant lung lesion.  No adjuvant therapy was needed.  She is status post cataract extractions, appendectomy and umbilical herniorrhaphy.  She suffers from Parkinson's disease, hyperlipidemia and orthostatic hypotension.  She has an allergy to sulfa.  She is a non-smoker.  Family history is notable for a mother with tremor.

## 2024-04-18 NOTE — ASSESSMENT
[FreeTextEntry1] : Ms. Breaux is a 74-year-old who suffers from Parkinson's dementia with psychotic features.  Her motor disorder is not severe.  Her dementia and psychotic features are problematic.  I suggested again referral to [facility] geriatric psychiatry clinic.  She might benefit from increasing her antipsychotics but this will require monitoring of her QT interval.  I explained to her daughter that there is increased risk of stroke and death when patients with dementia are treated with atypical antipsychotics.  She will return to the office in 6 to 9 months for routine follow-up.  Telephone contact will be maintained in the meantime.

## 2024-08-22 ENCOUNTER — APPOINTMENT (OUTPATIENT)
Dept: NEUROLOGY | Facility: CLINIC | Age: 74
End: 2024-08-22

## 2024-09-16 ENCOUNTER — INPATIENT (INPATIENT)
Facility: HOSPITAL | Age: 74
LOS: 2 days | Discharge: HOME HEALTH | End: 2024-09-19
Attending: INTERNAL MEDICINE | Admitting: INTERNAL MEDICINE
Payer: MEDICARE

## 2024-09-16 VITALS
TEMPERATURE: 98 F | WEIGHT: 110.01 LBS | DIASTOLIC BLOOD PRESSURE: 83 MMHG | OXYGEN SATURATION: 97 % | SYSTOLIC BLOOD PRESSURE: 127 MMHG | HEART RATE: 65 BPM | HEIGHT: 63 IN | RESPIRATION RATE: 18 BRPM

## 2024-09-16 DIAGNOSIS — K59.00 CONSTIPATION, UNSPECIFIED: ICD-10-CM

## 2024-09-16 DIAGNOSIS — K76.9 LIVER DISEASE, UNSPECIFIED: ICD-10-CM

## 2024-09-16 DIAGNOSIS — N17.9 ACUTE KIDNEY FAILURE, UNSPECIFIED: ICD-10-CM

## 2024-09-16 DIAGNOSIS — K52.9 NONINFECTIVE GASTROENTERITIS AND COLITIS, UNSPECIFIED: ICD-10-CM

## 2024-09-16 DIAGNOSIS — G30.9 ALZHEIMER'S DISEASE, UNSPECIFIED: ICD-10-CM

## 2024-09-16 DIAGNOSIS — E87.6 HYPOKALEMIA: ICD-10-CM

## 2024-09-16 DIAGNOSIS — Z29.9 ENCOUNTER FOR PROPHYLACTIC MEASURES, UNSPECIFIED: ICD-10-CM

## 2024-09-16 DIAGNOSIS — I95.9 HYPOTENSION, UNSPECIFIED: ICD-10-CM

## 2024-09-16 DIAGNOSIS — H26.40 UNSPECIFIED SECONDARY CATARACT: Chronic | ICD-10-CM

## 2024-09-16 DIAGNOSIS — Z90.49 ACQUIRED ABSENCE OF OTHER SPECIFIED PARTS OF DIGESTIVE TRACT: Chronic | ICD-10-CM

## 2024-09-16 DIAGNOSIS — R79.89 OTHER SPECIFIED ABNORMAL FINDINGS OF BLOOD CHEMISTRY: ICD-10-CM

## 2024-09-16 LAB
ALBUMIN SERPL ELPH-MCNC: 4 G/DL — SIGNIFICANT CHANGE UP (ref 3.3–5)
ALP SERPL-CCNC: 57 U/L — SIGNIFICANT CHANGE UP (ref 40–120)
ALT FLD-CCNC: 14 U/L — SIGNIFICANT CHANGE UP (ref 12–78)
ANION GAP SERPL CALC-SCNC: 9 MMOL/L — SIGNIFICANT CHANGE UP (ref 5–17)
APPEARANCE UR: CLEAR — SIGNIFICANT CHANGE UP
AST SERPL-CCNC: 30 U/L — SIGNIFICANT CHANGE UP (ref 15–37)
BASOPHILS # BLD AUTO: 0.03 K/UL — SIGNIFICANT CHANGE UP (ref 0–0.2)
BASOPHILS # BLD AUTO: 0.04 K/UL — SIGNIFICANT CHANGE UP (ref 0–0.2)
BASOPHILS NFR BLD AUTO: 0.4 % — SIGNIFICANT CHANGE UP (ref 0–2)
BASOPHILS NFR BLD AUTO: 0.4 % — SIGNIFICANT CHANGE UP (ref 0–2)
BILIRUB SERPL-MCNC: 1 MG/DL — SIGNIFICANT CHANGE UP (ref 0.2–1.2)
BILIRUB UR-MCNC: ABNORMAL
BUN SERPL-MCNC: 17 MG/DL — SIGNIFICANT CHANGE UP (ref 7–23)
CALCIUM SERPL-MCNC: 10 MG/DL — SIGNIFICANT CHANGE UP (ref 8.5–10.1)
CHLORIDE SERPL-SCNC: 106 MMOL/L — SIGNIFICANT CHANGE UP (ref 96–108)
CO2 SERPL-SCNC: 24 MMOL/L — SIGNIFICANT CHANGE UP (ref 22–31)
COLOR SPEC: SIGNIFICANT CHANGE UP
CREAT SERPL-MCNC: 1.36 MG/DL — HIGH (ref 0.5–1.3)
DIFF PNL FLD: NEGATIVE — SIGNIFICANT CHANGE UP
EGFR: 40 ML/MIN/1.73M2 — LOW
EOSINOPHIL # BLD AUTO: 0 K/UL — SIGNIFICANT CHANGE UP (ref 0–0.5)
EOSINOPHIL # BLD AUTO: 0.02 K/UL — SIGNIFICANT CHANGE UP (ref 0–0.5)
EOSINOPHIL NFR BLD AUTO: 0 % — SIGNIFICANT CHANGE UP (ref 0–6)
EOSINOPHIL NFR BLD AUTO: 0.2 % — SIGNIFICANT CHANGE UP (ref 0–6)
GLUCOSE SERPL-MCNC: 170 MG/DL — HIGH (ref 70–99)
GLUCOSE UR QL: NEGATIVE MG/DL — SIGNIFICANT CHANGE UP
HCT VFR BLD CALC: 33.9 % — LOW (ref 34.5–45)
HCT VFR BLD CALC: 37.5 % — SIGNIFICANT CHANGE UP (ref 34.5–45)
HGB BLD-MCNC: 11.4 G/DL — LOW (ref 11.5–15.5)
HGB BLD-MCNC: 12.6 G/DL — SIGNIFICANT CHANGE UP (ref 11.5–15.5)
IMM GRANULOCYTES NFR BLD AUTO: 0.4 % — SIGNIFICANT CHANGE UP (ref 0–0.9)
IMM GRANULOCYTES NFR BLD AUTO: 0.6 % — SIGNIFICANT CHANGE UP (ref 0–0.9)
KETONES UR-MCNC: ABNORMAL MG/DL
LACTATE SERPL-SCNC: 1.6 MMOL/L — SIGNIFICANT CHANGE UP (ref 0.7–2)
LACTATE SERPL-SCNC: 2.6 MMOL/L — HIGH (ref 0.7–2)
LEUKOCYTE ESTERASE UR-ACNC: NEGATIVE — SIGNIFICANT CHANGE UP
LIDOCAIN IGE QN: 58 U/L — SIGNIFICANT CHANGE UP (ref 13–75)
LYMPHOCYTES # BLD AUTO: 0.88 K/UL — LOW (ref 1–3.3)
LYMPHOCYTES # BLD AUTO: 1.18 K/UL — SIGNIFICANT CHANGE UP (ref 1–3.3)
LYMPHOCYTES # BLD AUTO: 13.8 % — SIGNIFICANT CHANGE UP (ref 13–44)
LYMPHOCYTES # BLD AUTO: 7.8 % — LOW (ref 13–44)
MCHC RBC-ENTMCNC: 28.7 PG — SIGNIFICANT CHANGE UP (ref 27–34)
MCHC RBC-ENTMCNC: 29.3 PG — SIGNIFICANT CHANGE UP (ref 27–34)
MCHC RBC-ENTMCNC: 33.6 G/DL — SIGNIFICANT CHANGE UP (ref 32–36)
MCHC RBC-ENTMCNC: 33.6 G/DL — SIGNIFICANT CHANGE UP (ref 32–36)
MCV RBC AUTO: 85.4 FL — SIGNIFICANT CHANGE UP (ref 80–100)
MCV RBC AUTO: 87.1 FL — SIGNIFICANT CHANGE UP (ref 80–100)
MONOCYTES # BLD AUTO: 0.16 K/UL — SIGNIFICANT CHANGE UP (ref 0–0.9)
MONOCYTES # BLD AUTO: 0.6 K/UL — SIGNIFICANT CHANGE UP (ref 0–0.9)
MONOCYTES NFR BLD AUTO: 1.9 % — LOW (ref 2–14)
MONOCYTES NFR BLD AUTO: 5.3 % — SIGNIFICANT CHANGE UP (ref 2–14)
NEUTROPHILS # BLD AUTO: 7.12 K/UL — SIGNIFICANT CHANGE UP (ref 1.8–7.4)
NEUTROPHILS # BLD AUTO: 9.71 K/UL — HIGH (ref 1.8–7.4)
NEUTROPHILS NFR BLD AUTO: 83.1 % — HIGH (ref 43–77)
NEUTROPHILS NFR BLD AUTO: 86.1 % — HIGH (ref 43–77)
NITRITE UR-MCNC: NEGATIVE — SIGNIFICANT CHANGE UP
NRBC # BLD: 0 /100 WBCS — SIGNIFICANT CHANGE UP (ref 0–0)
NRBC # BLD: 0 /100 WBCS — SIGNIFICANT CHANGE UP (ref 0–0)
PH UR: 6.5 — SIGNIFICANT CHANGE UP (ref 5–8)
PLATELET # BLD AUTO: 190 K/UL — SIGNIFICANT CHANGE UP (ref 150–400)
PLATELET # BLD AUTO: 219 K/UL — SIGNIFICANT CHANGE UP (ref 150–400)
POTASSIUM SERPL-MCNC: 2.9 MMOL/L — CRITICAL LOW (ref 3.5–5.3)
POTASSIUM SERPL-SCNC: 2.9 MMOL/L — CRITICAL LOW (ref 3.5–5.3)
PROT SERPL-MCNC: 7.4 GM/DL — SIGNIFICANT CHANGE UP (ref 6–8.3)
PROT UR-MCNC: SIGNIFICANT CHANGE UP MG/DL
RBC # BLD: 3.89 M/UL — SIGNIFICANT CHANGE UP (ref 3.8–5.2)
RBC # BLD: 4.39 M/UL — SIGNIFICANT CHANGE UP (ref 3.8–5.2)
RBC # FLD: 13.6 % — SIGNIFICANT CHANGE UP (ref 10.3–14.5)
RBC # FLD: 13.7 % — SIGNIFICANT CHANGE UP (ref 10.3–14.5)
SODIUM SERPL-SCNC: 139 MMOL/L — SIGNIFICANT CHANGE UP (ref 135–145)
SP GR SPEC: 1.03 — SIGNIFICANT CHANGE UP (ref 1–1.03)
TROPONIN I, HIGH SENSITIVITY RESULT: 8.1 NG/L — SIGNIFICANT CHANGE UP
UROBILINOGEN FLD QL: 1 MG/DL — SIGNIFICANT CHANGE UP (ref 0.2–1)
WBC # BLD: 11.28 K/UL — HIGH (ref 3.8–10.5)
WBC # BLD: 8.56 K/UL — SIGNIFICANT CHANGE UP (ref 3.8–10.5)
WBC # FLD AUTO: 11.28 K/UL — HIGH (ref 3.8–10.5)
WBC # FLD AUTO: 8.56 K/UL — SIGNIFICANT CHANGE UP (ref 3.8–10.5)

## 2024-09-16 PROCEDURE — 99222 1ST HOSP IP/OBS MODERATE 55: CPT

## 2024-09-16 PROCEDURE — 71045 X-RAY EXAM CHEST 1 VIEW: CPT | Mod: 26

## 2024-09-16 PROCEDURE — 74177 CT ABD & PELVIS W/CONTRAST: CPT | Mod: 26,MC

## 2024-09-16 PROCEDURE — 99285 EMERGENCY DEPT VISIT HI MDM: CPT

## 2024-09-16 PROCEDURE — 74018 RADEX ABDOMEN 1 VIEW: CPT | Mod: 26

## 2024-09-16 RX ORDER — POTASSIUM CHLORIDE 10 MEQ
20 TABLET, EXT RELEASE, PARTICLES/CRYSTALS ORAL
Refills: 0 | Status: DISCONTINUED | OUTPATIENT
Start: 2024-09-16 | End: 2024-09-16

## 2024-09-16 RX ORDER — POTASSIUM CHLORIDE 10 MEQ
10 TABLET, EXT RELEASE, PARTICLES/CRYSTALS ORAL
Refills: 0 | Status: COMPLETED | OUTPATIENT
Start: 2024-09-16 | End: 2024-09-16

## 2024-09-16 RX ORDER — MIDODRINE HYDROCHLORIDE 5 MG/1
2.5 TABLET ORAL THREE TIMES A DAY
Refills: 0 | Status: DISCONTINUED | OUTPATIENT
Start: 2024-09-16 | End: 2024-09-19

## 2024-09-16 RX ORDER — HALOPERIDOL 1 MG
2.5 TABLET ORAL ONCE
Refills: 0 | Status: COMPLETED | OUTPATIENT
Start: 2024-09-16 | End: 2024-09-16

## 2024-09-16 RX ORDER — ACETAMINOPHEN 325 MG/1
1000 TABLET ORAL ONCE
Refills: 0 | Status: COMPLETED | OUTPATIENT
Start: 2024-09-16 | End: 2024-09-16

## 2024-09-16 RX ORDER — POTASSIUM CHLORIDE 10 MEQ
40 TABLET, EXT RELEASE, PARTICLES/CRYSTALS ORAL ONCE
Refills: 0 | Status: COMPLETED | OUTPATIENT
Start: 2024-09-16 | End: 2024-09-16

## 2024-09-16 RX ORDER — METRONIDAZOLE 250 MG
500 TABLET ORAL ONCE
Refills: 0 | Status: COMPLETED | OUTPATIENT
Start: 2024-09-16 | End: 2024-09-16

## 2024-09-16 RX ORDER — ESCITALOPRAM OXALATE 10 MG/1
10 TABLET ORAL DAILY
Refills: 0 | Status: DISCONTINUED | OUTPATIENT
Start: 2024-09-16 | End: 2024-09-19

## 2024-09-16 RX ORDER — POLYETHYLENE GLYCOL 3350 17 G/17G
17 POWDER, FOR SOLUTION ORAL DAILY
Refills: 0 | Status: DISCONTINUED | OUTPATIENT
Start: 2024-09-16 | End: 2024-09-19

## 2024-09-16 RX ORDER — ONDANSETRON 2 MG/ML
4 INJECTION, SOLUTION INTRAMUSCULAR; INTRAVENOUS EVERY 8 HOURS
Refills: 0 | Status: DISCONTINUED | OUTPATIENT
Start: 2024-09-16 | End: 2024-09-19

## 2024-09-16 RX ORDER — ACETAMINOPHEN 325 MG/1
750 TABLET ORAL ONCE
Refills: 0 | Status: DISCONTINUED | OUTPATIENT
Start: 2024-09-16 | End: 2024-09-16

## 2024-09-16 RX ORDER — SODIUM CHLORIDE 9 MG/ML
1000 INJECTION INTRAMUSCULAR; INTRAVENOUS; SUBCUTANEOUS ONCE
Refills: 0 | Status: COMPLETED | OUTPATIENT
Start: 2024-09-16 | End: 2024-09-16

## 2024-09-16 RX ORDER — MAGNESIUM, ALUMINUM HYDROXIDE 200-225/5
30 SUSPENSION, ORAL (FINAL DOSE FORM) ORAL EVERY 4 HOURS
Refills: 0 | Status: DISCONTINUED | OUTPATIENT
Start: 2024-09-16 | End: 2024-09-19

## 2024-09-16 RX ORDER — SENNA 187 MG
2 TABLET ORAL AT BEDTIME
Refills: 0 | Status: DISCONTINUED | OUTPATIENT
Start: 2024-09-16 | End: 2024-09-19

## 2024-09-16 RX ORDER — ESCITALOPRAM OXALATE 10 MG/1
1 TABLET ORAL
Refills: 0 | DISCHARGE

## 2024-09-16 RX ORDER — HEPARIN SODIUM,BOVINE 1000/ML
5000 VIAL (ML) INJECTION EVERY 8 HOURS
Refills: 0 | Status: DISCONTINUED | OUTPATIENT
Start: 2024-09-16 | End: 2024-09-17

## 2024-09-16 RX ORDER — MIDODRINE HYDROCHLORIDE 5 MG/1
1 TABLET ORAL
Refills: 0 | DISCHARGE

## 2024-09-16 RX ORDER — CARBIDOPA/LEVODOPA 25MG-250MG
1 TABLET ORAL THREE TIMES A DAY
Refills: 0 | Status: DISCONTINUED | OUTPATIENT
Start: 2024-09-16 | End: 2024-09-19

## 2024-09-16 RX ORDER — ACETAMINOPHEN 325 MG/1
650 TABLET ORAL EVERY 6 HOURS
Refills: 0 | Status: DISCONTINUED | OUTPATIENT
Start: 2024-09-16 | End: 2024-09-19

## 2024-09-16 RX ORDER — METRONIDAZOLE 250 MG
500 TABLET ORAL EVERY 12 HOURS
Refills: 0 | Status: DISCONTINUED | OUTPATIENT
Start: 2024-09-16 | End: 2024-09-17

## 2024-09-16 RX ORDER — CARBIDOPA/LEVODOPA 25MG-250MG
1 TABLET ORAL
Refills: 0 | DISCHARGE

## 2024-09-16 RX ADMIN — Medication 75 MILLILITER(S): at 15:10

## 2024-09-16 RX ADMIN — Medication 100 MILLIEQUIVALENT(S): at 05:31

## 2024-09-16 RX ADMIN — MIDODRINE HYDROCHLORIDE 2.5 MILLIGRAM(S): 5 TABLET ORAL at 17:49

## 2024-09-16 RX ADMIN — Medication 2 TABLET(S): at 22:49

## 2024-09-16 RX ADMIN — Medication 4 MILLIGRAM(S): at 04:16

## 2024-09-16 RX ADMIN — ESCITALOPRAM OXALATE 10 MILLIGRAM(S): 10 TABLET ORAL at 08:35

## 2024-09-16 RX ADMIN — Medication 200 MILLIGRAM(S): at 17:45

## 2024-09-16 RX ADMIN — Medication 200 MILLIGRAM(S): at 06:48

## 2024-09-16 RX ADMIN — ACETAMINOPHEN 400 MILLIGRAM(S): 325 TABLET ORAL at 02:51

## 2024-09-16 RX ADMIN — Medication 2.5 MILLIGRAM(S): at 03:23

## 2024-09-16 RX ADMIN — Medication 25 MILLIGRAM(S): at 17:46

## 2024-09-16 RX ADMIN — Medication 100 MILLIEQUIVALENT(S): at 06:53

## 2024-09-16 RX ADMIN — SODIUM CHLORIDE 1000 MILLILITER(S): 9 INJECTION INTRAMUSCULAR; INTRAVENOUS; SUBCUTANEOUS at 02:51

## 2024-09-16 RX ADMIN — Medication 40 MILLIEQUIVALENT(S): at 15:09

## 2024-09-16 RX ADMIN — Medication 75 MILLILITER(S): at 07:25

## 2024-09-16 RX ADMIN — Medication 10 MILLIGRAM(S): at 22:37

## 2024-09-16 RX ADMIN — Medication 100 MILLIGRAM(S): at 08:39

## 2024-09-16 RX ADMIN — Medication 5000 UNIT(S): at 22:37

## 2024-09-16 RX ADMIN — Medication 100 MILLIEQUIVALENT(S): at 04:17

## 2024-09-16 RX ADMIN — Medication 1 TABLET(S): at 22:41

## 2024-09-16 RX ADMIN — Medication 5000 UNIT(S): at 15:10

## 2024-09-16 RX ADMIN — POLYETHYLENE GLYCOL 3350 17 GRAM(S): 17 POWDER, FOR SOLUTION ORAL at 15:11

## 2024-09-16 RX ADMIN — Medication 100 MILLIGRAM(S): at 17:52

## 2024-09-16 NOTE — ED ADULT NURSE NOTE - CHIEF COMPLAINT QUOTE
Patient BIB [facility] c/o generalized abdominal pain and diarrhea today. Pmh parkinson's, alzheimers.

## 2024-09-16 NOTE — ED ADULT NURSE NOTE - NSICDXPASTMEDICALHX_GEN_ALL_CORE_FT
PAST MEDICAL HISTORY:  Arthritis     Chronic back pain     GERD with esophagitis     HLD (hyperlipidemia)     HTN (hypertension)     HTN (hypertension)     Hyperlipidemia     Palpitations     Panic attack     Vertigo     Vertigo

## 2024-09-16 NOTE — ED ADULT NURSE NOTE - NSFALLLASTSIX_ED_ALL_ED
Tiffany Vance M.D.  First Assistant: Indigo Soni Smart Gardener needle wayne on the right, and Cobra Tiffany Vance M.D., FACOG       Unable to determine.

## 2024-09-16 NOTE — ED PROVIDER NOTE - CONSIDERATION OF ADMISSION OBSERVATION
will admit for abdominal pain and need for placement. family states that they are overwhelmed in taking care of mother Consideration of Admission/Observation

## 2024-09-16 NOTE — ED ADULT NURSE NOTE - NSFALLHARMRISKINTERV_ED_ALL_ED

## 2024-09-16 NOTE — PROGRESS NOTE ADULT - PROBLEM SELECTOR PLAN 1
- Present with c/o diarrhea and abdo pain  - CT a/p reviewed as above  - No Fever, no leukocytosis  - Lactic acid 2.6>> trend to resolution  - s/p 1L bolus in ED  - c/w Abx: cipro/flagyl  - Follow up culture and deescalate as needed   - follow up stool culture, GI pcr  - Tylenol for fever

## 2024-09-16 NOTE — ED ADULT TRIAGE NOTE - CHIEF COMPLAINT QUOTE
Patient BIB [facility] c/o generalized abdominal pain and diarrhea today. Pmh parkinson's, alzheimers.

## 2024-09-16 NOTE — H&P ADULT - PROBLEM SELECTOR PLAN 1
- Present with c/o diarrhea and abdo pain  - CT a/p reviewed as above  - No Fever, no leukocytosis  - Lactic acid 2.6>> trend to resolution  - Got 1L bolus in ED  - c/w Abx: cipro/flagyl  - Follow up culture and deescalate as needed   - follow up c-diff, stool culture, calprotectin, ova and parasites, giardia  - Tylenol for fever

## 2024-09-16 NOTE — H&P ADULT - ASSESSMENT
74-year-old female with past medical history of Parkinson's disease with dementia and behavioral disturbance, hyperlipidemia, hypotension on midodrine, and mood disorder who was brought in the ED by daughter with reports of loose stools and abdominal pain. Found with mild nonspecific colitis, hypokalemia and TERRELL. Admit to medicine

## 2024-09-16 NOTE — ED ADULT NURSE NOTE - OBJECTIVE STATEMENT
Pt is a 75yo female AAOx1 allergies to sulfa drugs pmh hld, htn, gerd, alzeheimers dementia BIBA for diarrhea and generalized abdominal pain. Pt respirations equal and unlabored bilaterally. Pt poor historian, unable to extract pertinent data on exam. Pt updated on plan of care. Pt is a 75yo female AAOx1 allergies to sulfa drugs pmh hld, htn, gerd, alzeheimers dementia BIBA for syncopy, diarrhea and generalized abdominal pain. Pt respirations equal and unlabored bilaterally. Pt poor historian, unable to extract pertinent data on exam. Pt updated on plan of care.

## 2024-09-16 NOTE — H&P ADULT - NSHPPHYSICALEXAM_GEN_ALL_CORE
GENERAL: NAD, lying in bed comfortably  HEAD:  Atraumatic, normocephalic  EYES: EOMI, PERRL  NECK: Supple, trachea midline, no JVD  HEART: Regular rate and rhythm  LUNGS: Unlabored respirations.  Clear to auscultation bilaterally, no crackles, wheezing, or rhonchi  ABDOMEN: Soft, nontender, nondistended, +BS  EXTREMITIES: 2+ peripheral pulses bilaterally. No clubbing, cyanosis, or edema  NERVOUS SYSTEM: somnolent, moving all extremities, no focal deficits

## 2024-09-16 NOTE — H&P ADULT - PROBLEM SELECTOR PLAN 3
- Pt presented with diarrhea( possibly overflow diarrhea in the setting of chronic constipation)  - start miralax and senna  - obtain f/u abdo x-ray

## 2024-09-16 NOTE — ED PROVIDER NOTE - CLINICAL SUMMARY MEDICAL DECISION MAKING FREE TEXT BOX
elderly female w/ HTN, dementia presenting to the ED for abdominal pain and diarrhea    dementia worsening over the past 2 years  abd pain x 4 weeks, no oral intake per daughter    concern for infectious colitis, uti  labs, UA  hypokalemia -> K repletion  CT imaging revealing colitis & constipation  will give empiric abx and admit for advanced dementia and colitis

## 2024-09-16 NOTE — H&P ADULT - NSHPLABSRESULTS_GEN_ALL_CORE
LABS:  cret                        11.4   11.28 )-----------( 190      ( 16 Sep 2024 06:50 )             33.9     09-16    139  |  106  |  17  ----------------------------<  170<H>  2.9<LL>   |  24  |  1.36<H>    Ca    10.0      16 Sep 2024 02:28    TPro  7.4  /  Alb  4.0  /  TBili  1.0  /  DBili  x   /  AST  30  /  ALT  14  /  AlkPhos  57  09-16    < from: CT Abdomen and Pelvis w/ IV Cont (09.16.24 @ 05:21) >    IMPRESSION:    Mild nonspecific colitis involving transverse colon through rectosigmoid.  Moderate fecal load. Moderate looping of the colon suggests chronic   constipation.    An indeterminate 6 mm hypodensity in the right lobe of the liver may be   further characterized with MRI if there are no prior studies for   comparison.    < end of copied text >

## 2024-09-16 NOTE — ED ADULT NURSE NOTE - SUICIDE SCREENING DEPRESSION
Mom is calling requesting a refill on amphetamine-dextroamphetamine (ADDERALL XR) 15 MG 24 hr capsule [[id number] [address]  
Pt follows here for meds please refill had appt in 2/24  
Negative

## 2024-09-16 NOTE — H&P ADULT - HISTORY OF PRESENT ILLNESS
74-year-old female with past medical history of Parkinson's disease with dementia and behavioral disturbance, hyperlipidemia, hypotension on midodrine, and mood disorder who was brought in the ED by daughter with reports of loose stools and abdominal pain. Review of systems Not obtained given the patient's mental status. History obtained mainly from chart review. Attempt to call daughter and granddaughter vis pone unsuccessful  In ED, Vital signs were stable. Labs were notable for K2.9, creatinine 1.36(0.73 in 2021m per record ),Lactate 2.6. CT A/P with mild nonspecific colitis involving the transverse colon through rectosigmoid, moderate fecal burden, 6 mm hypodense lesion in the right lobe of the liver. The patient was given Cipro, Flagyl, 1 liter of normal saline bolus, and Tylenol.

## 2024-09-16 NOTE — ED ADULT NURSE REASSESSMENT NOTE - NS ED NURSE REASSESS COMMENT FT1
report given to Anupa on unit 1C. pt stable and demonstrates no s/s of acute distress at this time. heplock patent and intact. safety maintained.

## 2024-09-16 NOTE — ED PROVIDER NOTE - PHYSICAL EXAMINATION
General: Well appearing elderly female in no acute distress  HEENT: Normocephalic, atraumatic. Dry mucous membranes. Oropharynx clear. No lymphadenopathy.  Eyes: No scleral icterus. EOMI. HANNA.  Neck:. Soft and supple. Full ROM without pain. No midline tenderness  Cardiac: Regular rate and regular rhythm. No murmurs, rubs, gallops. Peripheral pulses 2+ and symmetric. No LE edema.  Resp: Lungs CTAB. No wheezes, rales or rhonchi.  Abd: Soft, non-tender, non-distended. No guarding or rebound.  : no stool in vault, no blood  Back: Spine midline and non-tender. No CVA tenderness.    Skin: No rashes, abrasions, or lacerations.  Neuro: AO x 0. minimally responsive. Moves all extremities symmetrically. Motor strength and sensation grossly intact.

## 2024-09-16 NOTE — ED PROVIDER NOTE - FAMILY DETAILS FREE TEXT FOR MDM ADDL HISTORY OBTAINED FROM QUESTION
Daughter (Radha) states that patient requires care greater than the care provided by insurance.  patient unable to perform ADLs, no longer acknowledging food, drink  Pt is in need of 24/7 home care or placement

## 2024-09-16 NOTE — H&P ADULT - PROBLEM SELECTOR PLAN 4
- Cr elevated on admission   - Possibly prerenal  - trial of IVF LR @75cc/hr  - Avoid nephrotoxin  - i/os  - f/u am labs  - Obtain renal u/s and nephro consult if worsening

## 2024-09-17 LAB
ALBUMIN SERPL ELPH-MCNC: 2.6 G/DL — LOW (ref 3.3–5)
ALP SERPL-CCNC: 41 U/L — SIGNIFICANT CHANGE UP (ref 40–120)
ALT FLD-CCNC: 15 U/L — SIGNIFICANT CHANGE UP (ref 12–78)
ANION GAP SERPL CALC-SCNC: 4 MMOL/L — LOW (ref 5–17)
AST SERPL-CCNC: 17 U/L — SIGNIFICANT CHANGE UP (ref 15–37)
BASOPHILS # BLD AUTO: 0.03 K/UL — SIGNIFICANT CHANGE UP (ref 0–0.2)
BASOPHILS NFR BLD AUTO: 0.4 % — SIGNIFICANT CHANGE UP (ref 0–2)
BILIRUB SERPL-MCNC: 0.5 MG/DL — SIGNIFICANT CHANGE UP (ref 0.2–1.2)
BUN SERPL-MCNC: 11 MG/DL — SIGNIFICANT CHANGE UP (ref 7–23)
CALCIUM SERPL-MCNC: 7.9 MG/DL — LOW (ref 8.5–10.1)
CHLORIDE SERPL-SCNC: 113 MMOL/L — HIGH (ref 96–108)
CO2 SERPL-SCNC: 24 MMOL/L — SIGNIFICANT CHANGE UP (ref 22–31)
CREAT SERPL-MCNC: 0.82 MG/DL — SIGNIFICANT CHANGE UP (ref 0.5–1.3)
CULTURE RESULTS: SIGNIFICANT CHANGE UP
EGFR: 74 ML/MIN/1.73M2 — SIGNIFICANT CHANGE UP
EOSINOPHIL # BLD AUTO: 0.01 K/UL — SIGNIFICANT CHANGE UP (ref 0–0.5)
EOSINOPHIL NFR BLD AUTO: 0.1 % — SIGNIFICANT CHANGE UP (ref 0–6)
GLUCOSE SERPL-MCNC: 74 MG/DL — SIGNIFICANT CHANGE UP (ref 70–99)
HCT VFR BLD CALC: 29.5 % — LOW (ref 34.5–45)
HGB BLD-MCNC: 9.8 G/DL — LOW (ref 11.5–15.5)
IMM GRANULOCYTES NFR BLD AUTO: 0.3 % — SIGNIFICANT CHANGE UP (ref 0–0.9)
LYMPHOCYTES # BLD AUTO: 1.31 K/UL — SIGNIFICANT CHANGE UP (ref 1–3.3)
LYMPHOCYTES # BLD AUTO: 17.4 % — SIGNIFICANT CHANGE UP (ref 13–44)
MAGNESIUM SERPL-MCNC: 2 MG/DL — SIGNIFICANT CHANGE UP (ref 1.6–2.6)
MCHC RBC-ENTMCNC: 29.6 PG — SIGNIFICANT CHANGE UP (ref 27–34)
MCHC RBC-ENTMCNC: 33.2 G/DL — SIGNIFICANT CHANGE UP (ref 32–36)
MCV RBC AUTO: 89.1 FL — SIGNIFICANT CHANGE UP (ref 80–100)
MONOCYTES # BLD AUTO: 0.37 K/UL — SIGNIFICANT CHANGE UP (ref 0–0.9)
MONOCYTES NFR BLD AUTO: 4.9 % — SIGNIFICANT CHANGE UP (ref 2–14)
NEUTROPHILS # BLD AUTO: 5.77 K/UL — SIGNIFICANT CHANGE UP (ref 1.8–7.4)
NEUTROPHILS NFR BLD AUTO: 76.9 % — SIGNIFICANT CHANGE UP (ref 43–77)
NRBC # BLD: 0 /100 WBCS — SIGNIFICANT CHANGE UP (ref 0–0)
PHOSPHATE SERPL-MCNC: 2 MG/DL — LOW (ref 2.5–4.5)
PLATELET # BLD AUTO: 154 K/UL — SIGNIFICANT CHANGE UP (ref 150–400)
POTASSIUM SERPL-MCNC: 3.6 MMOL/L — SIGNIFICANT CHANGE UP (ref 3.5–5.3)
POTASSIUM SERPL-SCNC: 3.6 MMOL/L — SIGNIFICANT CHANGE UP (ref 3.5–5.3)
PROT SERPL-MCNC: 5.4 GM/DL — LOW (ref 6–8.3)
RBC # BLD: 3.31 M/UL — LOW (ref 3.8–5.2)
RBC # FLD: 14.6 % — HIGH (ref 10.3–14.5)
SODIUM SERPL-SCNC: 141 MMOL/L — SIGNIFICANT CHANGE UP (ref 135–145)
SPECIMEN SOURCE: SIGNIFICANT CHANGE UP
WBC # BLD: 7.51 K/UL — SIGNIFICANT CHANGE UP (ref 3.8–10.5)
WBC # FLD AUTO: 7.51 K/UL — SIGNIFICANT CHANGE UP (ref 3.8–10.5)

## 2024-09-17 PROCEDURE — 70450 CT HEAD/BRAIN W/O DYE: CPT | Mod: 26

## 2024-09-17 PROCEDURE — 99232 SBSQ HOSP IP/OBS MODERATE 35: CPT

## 2024-09-17 RX ORDER — HEPARIN SODIUM,BOVINE 1000/ML
5000 VIAL (ML) INJECTION EVERY 12 HOURS
Refills: 0 | Status: DISCONTINUED | OUTPATIENT
Start: 2024-09-17 | End: 2024-09-18

## 2024-09-17 RX ADMIN — Medication 1 TABLET(S): at 14:03

## 2024-09-17 RX ADMIN — Medication 25 MILLIGRAM(S): at 05:28

## 2024-09-17 RX ADMIN — Medication 100 MILLIGRAM(S): at 05:24

## 2024-09-17 RX ADMIN — Medication 75 MILLILITER(S): at 03:09

## 2024-09-17 RX ADMIN — Medication 200 MILLIGRAM(S): at 05:24

## 2024-09-17 RX ADMIN — Medication 25 MILLIGRAM(S): at 18:11

## 2024-09-17 RX ADMIN — Medication 1 TABLET(S): at 22:15

## 2024-09-17 RX ADMIN — Medication 10 MILLIGRAM(S): at 22:12

## 2024-09-17 RX ADMIN — MIDODRINE HYDROCHLORIDE 2.5 MILLIGRAM(S): 5 TABLET ORAL at 12:53

## 2024-09-17 RX ADMIN — Medication 2 TABLET(S): at 22:12

## 2024-09-17 RX ADMIN — Medication 5000 UNIT(S): at 18:11

## 2024-09-17 RX ADMIN — Medication 5000 UNIT(S): at 05:27

## 2024-09-17 RX ADMIN — Medication 1 TABLET(S): at 05:27

## 2024-09-17 RX ADMIN — ESCITALOPRAM OXALATE 10 MILLIGRAM(S): 10 TABLET ORAL at 05:26

## 2024-09-17 RX ADMIN — POLYETHYLENE GLYCOL 3350 17 GRAM(S): 17 POWDER, FOR SOLUTION ORAL at 12:51

## 2024-09-17 NOTE — PHYSICAL THERAPY INITIAL EVALUATION ADULT - PATIENT PROFILE REVIEW, REHAB EVAL
Pt a/o x 4, vss. Pt ambulates in room per self, steady gait. Pt getting PICC for long term antibiotics. Discussed POC, offered emotional support, instructed pt to use call light for assistance.   Problem: Patient Centered Care  Goal: Patient preferences are identified and integrated in the patient's plan of care  Description: Interventions:  - What would you like us to know as we care for you?   - Provide timely, complete, and accurate information to patient/family  - Incorporate patient and family knowledge, values, beliefs, and cultural backgrounds into the planning and delivery of care  - Encourage patient/family to participate in care and decision-making at the level they choose  - Honor patient and family perspectives and choices  Outcome: Progressing     Problem: Patient/Family Goals  Goal: Patient/Family Long Term Goal  Description: Patient's Long Term Goal:     Interventions:  -   - See additional Care Plan goals for specific interventions  Outcome: Progressing  Goal: Patient/Family Short Term Goal  Description: Patient's Short Term Goal:     Interventions:   -   - See additional Care Plan goals for specific interventions  Outcome: Progressing     Problem: CARDIOVASCULAR - ADULT  Goal: Maintains optimal cardiac output and hemodynamic stability  Description: INTERVENTIONS:  - Monitor vital signs, rhythm, and trends  - Monitor for bleeding, hypotension and signs of decreased cardiac output  - Evaluate effectiveness of vasoactive medications to optimize hemodynamic stability  - Monitor arterial and/or venous puncture sites for bleeding and/or hematoma  - Assess quality of pulses, skin color and temperature  - Assess for signs of decreased coronary artery perfusion - ex. Angina  - Evaluate fluid balance, assess for edema, trend weights  Outcome: Progressing  Goal: Absence of cardiac arrhythmias or at baseline  Description: INTERVENTIONS:  - Continuous cardiac monitoring, monitor vital signs, obtain 12 lead EKG  if indicated  - Evaluate effectiveness of antiarrhythmic and heart rate control medications as ordered  - Initiate emergency measures for life threatening arrhythmias  - Monitor electrolytes and administer replacement therapy as ordered  Outcome: Progressing     Problem: SKIN/TISSUE INTEGRITY - ADULT  Goal: Skin integrity remains intact  Description: INTERVENTIONS  - Assess and document risk factors for pressure ulcer development  - Assess and document skin integrity  - Monitor for areas of redness and/or skin breakdown  - Initiate interventions, skin care algorithm/standards of care as needed  Outcome: Progressing  Goal: Incision(s), wounds(s) or drain site(s) healing without S/S of infection  Description: INTERVENTIONS:  - Assess and document risk factors for pressure ulcer development  - Assess and document skin integrity  - Assess and document dressing/incision, wound bed, drain sites and surrounding tissue  - Implement wound care per orders  - Initiate isolation precautions as appropriate  - Initiate Pressure Ulcer prevention bundle as indicated  Outcome: Progressing  Goal: Oral mucous membranes remain intact  Description: INTERVENTIONS  - Assess oral mucosa and hygiene practices  - Implement preventative oral hygiene regimen  - Implement oral medicated treatments as ordered  Outcome: Progressing     Problem: SAFETY ADULT - FALL  Goal: Free from fall injury  Description: INTERVENTIONS:  - Assess pt frequently for physical needs  - Identify cognitive and physical deficits and behaviors that affect risk of falls.  - [place] fall precautions as indicated by assessment.  - Educate pt/family on patient safety including physical limitations  - Instruct pt to call for assistance with activity based on assessment  - Modify environment to reduce risk of injury  - Provide assistive devices as appropriate  - Consider OT/PT consult to assist with strengthening/mobility  - Encourage toileting schedule  Outcome:  Progressing      yes

## 2024-09-17 NOTE — PHYSICAL THERAPY INITIAL EVALUATION ADULT - BED MOBILITY TRAINING, PT EVAL
Pt will independently perform all aspects of bed mobility to help prevent pressure ulcers in 2-3 days.

## 2024-09-17 NOTE — PHYSICAL THERAPY INITIAL EVALUATION ADULT - ADDITIONAL COMMENTS
Per previous PT eval attempt, patient lives in an apartment. Patient has a HHA 45 hours/week Monday-Friday. Patient walks with or without rolling walker.

## 2024-09-17 NOTE — PROGRESS NOTE ADULT - PROBLEM SELECTOR PLAN 1
- Present with c/o diarrhea and abdo pain  - CT a/p reviewed as above  - No Fever, no leukocytosis  - Lactic acid 2.6>> trend to resolution  - s/p 1L bolus in ED  - discontinue cipro/flagyl  - follow up stool culture, GI pcr  - Tylenol for fever

## 2024-09-18 LAB
ANION GAP SERPL CALC-SCNC: 5 MMOL/L — SIGNIFICANT CHANGE UP (ref 5–17)
BUN SERPL-MCNC: 8 MG/DL — SIGNIFICANT CHANGE UP (ref 7–23)
CALCIUM SERPL-MCNC: 8.4 MG/DL — LOW (ref 8.5–10.1)
CHLORIDE SERPL-SCNC: 111 MMOL/L — HIGH (ref 96–108)
CO2 SERPL-SCNC: 27 MMOL/L — SIGNIFICANT CHANGE UP (ref 22–31)
CREAT SERPL-MCNC: 0.65 MG/DL — SIGNIFICANT CHANGE UP (ref 0.5–1.3)
EGFR: 91 ML/MIN/1.73M2 — SIGNIFICANT CHANGE UP
GI PCR PANEL: SIGNIFICANT CHANGE UP
GLUCOSE SERPL-MCNC: 85 MG/DL — SIGNIFICANT CHANGE UP (ref 70–99)
HCT VFR BLD CALC: 29.1 % — LOW (ref 34.5–45)
HGB BLD-MCNC: 9.5 G/DL — LOW (ref 11.5–15.5)
MCHC RBC-ENTMCNC: 28.9 PG — SIGNIFICANT CHANGE UP (ref 27–34)
MCHC RBC-ENTMCNC: 32.6 G/DL — SIGNIFICANT CHANGE UP (ref 32–36)
MCV RBC AUTO: 88.4 FL — SIGNIFICANT CHANGE UP (ref 80–100)
NRBC # BLD: 0 /100 WBCS — SIGNIFICANT CHANGE UP (ref 0–0)
PLATELET # BLD AUTO: 158 K/UL — SIGNIFICANT CHANGE UP (ref 150–400)
POTASSIUM SERPL-MCNC: 3.3 MMOL/L — LOW (ref 3.5–5.3)
POTASSIUM SERPL-SCNC: 3.3 MMOL/L — LOW (ref 3.5–5.3)
RBC # BLD: 3.29 M/UL — LOW (ref 3.8–5.2)
RBC # FLD: 14.4 % — SIGNIFICANT CHANGE UP (ref 10.3–14.5)
SODIUM SERPL-SCNC: 143 MMOL/L — SIGNIFICANT CHANGE UP (ref 135–145)
WBC # BLD: 7.09 K/UL — SIGNIFICANT CHANGE UP (ref 3.8–10.5)
WBC # FLD AUTO: 7.09 K/UL — SIGNIFICANT CHANGE UP (ref 3.8–10.5)

## 2024-09-18 PROCEDURE — 99232 SBSQ HOSP IP/OBS MODERATE 35: CPT

## 2024-09-18 RX ORDER — POTASSIUM CHLORIDE 10 MEQ
40 TABLET, EXT RELEASE, PARTICLES/CRYSTALS ORAL ONCE
Refills: 0 | Status: COMPLETED | OUTPATIENT
Start: 2024-09-18 | End: 2024-09-18

## 2024-09-18 RX ORDER — ENOXAPARIN SODIUM 100 MG/ML
40 INJECTION SUBCUTANEOUS EVERY 24 HOURS
Refills: 0 | Status: DISCONTINUED | OUTPATIENT
Start: 2024-09-18 | End: 2024-09-19

## 2024-09-18 RX ORDER — POTASSIUM PHOSPHATE 236; 224 MG/ML; MG/ML
15 INJECTION, SOLUTION INTRAVENOUS ONCE
Refills: 0 | Status: COMPLETED | OUTPATIENT
Start: 2024-09-18 | End: 2024-09-19

## 2024-09-18 RX ORDER — SENNA 187 MG
2 TABLET ORAL
Qty: 0 | Refills: 0 | DISCHARGE
Start: 2024-09-18

## 2024-09-18 RX ORDER — POLYETHYLENE GLYCOL 3350 17 G/17G
17 POWDER, FOR SOLUTION ORAL
Qty: 0 | Refills: 0 | DISCHARGE
Start: 2024-09-18

## 2024-09-18 RX ADMIN — Medication 25 MILLIGRAM(S): at 18:50

## 2024-09-18 RX ADMIN — ENOXAPARIN SODIUM 40 MILLIGRAM(S): 100 INJECTION SUBCUTANEOUS at 18:51

## 2024-09-18 RX ADMIN — Medication 1 TABLET(S): at 23:00

## 2024-09-18 RX ADMIN — ACETAMINOPHEN 650 MILLIGRAM(S): 325 TABLET ORAL at 21:42

## 2024-09-18 RX ADMIN — Medication 40 MILLIEQUIVALENT(S): at 10:40

## 2024-09-18 RX ADMIN — Medication 3 MILLIGRAM(S): at 01:48

## 2024-09-18 RX ADMIN — Medication 1 TABLET(S): at 06:24

## 2024-09-18 RX ADMIN — Medication 10 MILLIGRAM(S): at 22:59

## 2024-09-18 RX ADMIN — Medication 75 MILLILITER(S): at 07:50

## 2024-09-18 RX ADMIN — Medication 1 TABLET(S): at 14:33

## 2024-09-18 RX ADMIN — MIDODRINE HYDROCHLORIDE 2.5 MILLIGRAM(S): 5 TABLET ORAL at 11:39

## 2024-09-18 RX ADMIN — Medication 5000 UNIT(S): at 06:23

## 2024-09-18 RX ADMIN — ACETAMINOPHEN 650 MILLIGRAM(S): 325 TABLET ORAL at 20:42

## 2024-09-18 RX ADMIN — POLYETHYLENE GLYCOL 3350 17 GRAM(S): 17 POWDER, FOR SOLUTION ORAL at 11:39

## 2024-09-18 RX ADMIN — Medication 2 TABLET(S): at 21:39

## 2024-09-18 RX ADMIN — Medication 25 MILLIGRAM(S): at 06:25

## 2024-09-18 RX ADMIN — MIDODRINE HYDROCHLORIDE 2.5 MILLIGRAM(S): 5 TABLET ORAL at 18:48

## 2024-09-18 RX ADMIN — ESCITALOPRAM OXALATE 10 MILLIGRAM(S): 10 TABLET ORAL at 06:24

## 2024-09-18 NOTE — PROGRESS NOTE ADULT - PROBLEM SELECTOR PLAN 4
- resolved  - Cr elevated on admission   - Possibly prerenal  - Avoid nephrotoxin  - i/os
- Cr elevated on admission   - Possibly prerenal  - Avoid nephrotoxin  - i/os
- resolved  - Cr elevated on admission   - Possibly prerenal  - Avoid nephrotoxin  - i/os

## 2024-09-18 NOTE — PHARMACOTHERAPY INTERVENTION NOTE - COMMENTS
Renal function stable; recommended to switch heparin to lovenox 40mg PPX for ease of administration. Thank you

## 2024-09-18 NOTE — PROGRESS NOTE ADULT - PROBLEM SELECTOR PLAN 7
Resume home meds; carbidopa/levodopa, seroquel, escitalopram

## 2024-09-18 NOTE — PROGRESS NOTE ADULT - PROBLEM SELECTOR PROBLEM 3
Alcohol abuse    Anxiety    Depression    Iron deficiency anemia, unspecified iron deficiency anemia type    Liver fatty degeneration    
Constipation

## 2024-09-18 NOTE — PROGRESS NOTE ADULT - PROBLEM SELECTOR PLAN 1
- Present with c/o diarrhea and abdo pain. possibly overflow diarrhea in the setting of chronic constipation)  - CT a/p reviewed as above, mild colitis likely due to constipation  - No Fever, no leukocytosis  - Lactic acid 2.6>> trend to resolution  - s/p 1L bolus in ED  - GI PCR negative  - discontinue cipro/flagyl  - bowel regimen for daily BM - Present with c/o diarrhea and abdo pain. possibly overflow diarrhea in the setting of chronic constipation)  - CT a/p reviewed as above, mild colitis likely due to constipation  - No Fever, no leukocytosis  - Lactic acid 2.6>> trend to resolution  - s/p 1L bolus in ED  - GI PCR negative  - discontinue cipro/flagyl  - bowel regimen for daily BM    DISPO: AYDEE

## 2024-09-18 NOTE — PROGRESS NOTE ADULT - PROBLEM SELECTOR PROBLEM 7
Alzheimer's disease with Parkinson's disease

## 2024-09-18 NOTE — PROGRESS NOTE ADULT - PROBLEM SELECTOR PLAN 3
- Pt presented with diarrhea( possibly overflow diarrhea in the setting of chronic constipation)  - continue with miralax and senna  - monitor BM
- Pt presented with diarrhea( possibly overflow diarrhea in the setting of chronic constipation)  - started miralax and senna  - monitor BM
- Pt presented with diarrhea( possibly overflow diarrhea in the setting of chronic constipation)  - started miralax and senna

## 2024-09-19 ENCOUNTER — TRANSCRIPTION ENCOUNTER (OUTPATIENT)
Age: 74
End: 2024-09-19

## 2024-09-19 VITALS
HEART RATE: 87 BPM | TEMPERATURE: 99 F | RESPIRATION RATE: 17 BRPM | SYSTOLIC BLOOD PRESSURE: 135 MMHG | OXYGEN SATURATION: 95 % | DIASTOLIC BLOOD PRESSURE: 88 MMHG

## 2024-09-19 LAB
24R-OH-CALCIDIOL SERPL-MCNC: 60 NG/ML — SIGNIFICANT CHANGE UP (ref 30–80)
ANION GAP SERPL CALC-SCNC: 5 MMOL/L — SIGNIFICANT CHANGE UP (ref 5–17)
BUN SERPL-MCNC: 10 MG/DL — SIGNIFICANT CHANGE UP (ref 7–23)
CALCIUM SERPL-MCNC: 8.4 MG/DL — LOW (ref 8.5–10.1)
CHLORIDE SERPL-SCNC: 110 MMOL/L — HIGH (ref 96–108)
CO2 SERPL-SCNC: 25 MMOL/L — SIGNIFICANT CHANGE UP (ref 22–31)
CREAT SERPL-MCNC: 0.68 MG/DL — SIGNIFICANT CHANGE UP (ref 0.5–1.3)
EGFR: 90 ML/MIN/1.73M2 — SIGNIFICANT CHANGE UP
FOLATE SERPL-MCNC: 16.8 NG/ML — SIGNIFICANT CHANGE UP
GLUCOSE SERPL-MCNC: 85 MG/DL — SIGNIFICANT CHANGE UP (ref 70–99)
HCT VFR BLD CALC: 30.7 % — LOW (ref 34.5–45)
HGB BLD-MCNC: 10.1 G/DL — LOW (ref 11.5–15.5)
MCHC RBC-ENTMCNC: 29.2 PG — SIGNIFICANT CHANGE UP (ref 27–34)
MCHC RBC-ENTMCNC: 32.9 G/DL — SIGNIFICANT CHANGE UP (ref 32–36)
MCV RBC AUTO: 88.7 FL — SIGNIFICANT CHANGE UP (ref 80–100)
NRBC # BLD: 0 /100 WBCS — SIGNIFICANT CHANGE UP (ref 0–0)
PHOSPHATE SERPL-MCNC: 3.5 MG/DL — SIGNIFICANT CHANGE UP (ref 2.5–4.5)
PLATELET # BLD AUTO: 169 K/UL — SIGNIFICANT CHANGE UP (ref 150–400)
POTASSIUM SERPL-MCNC: 3.3 MMOL/L — LOW (ref 3.5–5.3)
POTASSIUM SERPL-SCNC: 3.3 MMOL/L — LOW (ref 3.5–5.3)
RBC # BLD: 3.46 M/UL — LOW (ref 3.8–5.2)
RBC # FLD: 14.3 % — SIGNIFICANT CHANGE UP (ref 10.3–14.5)
SODIUM SERPL-SCNC: 140 MMOL/L — SIGNIFICANT CHANGE UP (ref 135–145)
VIT B12 SERPL-MCNC: 683 PG/ML — SIGNIFICANT CHANGE UP (ref 232–1245)
WBC # BLD: 5.94 K/UL — SIGNIFICANT CHANGE UP (ref 3.8–10.5)
WBC # FLD AUTO: 5.94 K/UL — SIGNIFICANT CHANGE UP (ref 3.8–10.5)

## 2024-09-19 PROCEDURE — 99239 HOSP IP/OBS DSCHRG MGMT >30: CPT

## 2024-09-19 RX ORDER — POTASSIUM CHLORIDE 10 MEQ
40 TABLET, EXT RELEASE, PARTICLES/CRYSTALS ORAL ONCE
Refills: 0 | Status: COMPLETED | OUTPATIENT
Start: 2024-09-19 | End: 2024-09-19

## 2024-09-19 RX ADMIN — ACETAMINOPHEN 650 MILLIGRAM(S): 325 TABLET ORAL at 14:44

## 2024-09-19 RX ADMIN — Medication 3 MILLIGRAM(S): at 00:35

## 2024-09-19 RX ADMIN — ESCITALOPRAM OXALATE 10 MILLIGRAM(S): 10 TABLET ORAL at 06:00

## 2024-09-19 RX ADMIN — Medication 40 MILLIEQUIVALENT(S): at 12:01

## 2024-09-19 RX ADMIN — ACETAMINOPHEN 650 MILLIGRAM(S): 325 TABLET ORAL at 15:44

## 2024-09-19 RX ADMIN — Medication 25 MILLIGRAM(S): at 05:58

## 2024-09-19 RX ADMIN — Medication 25 MILLIGRAM(S): at 17:06

## 2024-09-19 RX ADMIN — MIDODRINE HYDROCHLORIDE 2.5 MILLIGRAM(S): 5 TABLET ORAL at 12:02

## 2024-09-19 RX ADMIN — POLYETHYLENE GLYCOL 3350 17 GRAM(S): 17 POWDER, FOR SOLUTION ORAL at 12:02

## 2024-09-19 RX ADMIN — POTASSIUM PHOSPHATE 62.5 MILLIMOLE(S): 236; 224 INJECTION, SOLUTION INTRAVENOUS at 12:02

## 2024-09-19 RX ADMIN — Medication 1 TABLET(S): at 06:00

## 2024-09-19 RX ADMIN — Medication 1 TABLET(S): at 14:45

## 2024-09-19 NOTE — PROGRESS NOTE ADULT - ASSESSMENT
74-year-old female with past medical history of Parkinson's disease with dementia and behavioral disturbance, hyperlipidemia, hypotension on midodrine, and mood disorder who was brought in the ED by daughter with reports of loose stools and abdominal pain. Found with mild nonspecific colitis, hypokalemia and TERRELL. Admit to medicine    Problem/Plan - 1:  ·  Problem: Acute stercoral colitis associated with severe constipation and lactic acidosis. improved. off antibiotics  no fever no abd pain  cont laxatives with aim to have 1-2 BM a day.     2. Hypophosphatemia: Repleted    3. AOCD, Hb declined likely from hemodilution with hydration. check anemia work up      Problem/Plan - 4:  ·  Problem: TERRELL (acute kidney injury).   Likely pre-renal with poor oral fluid intake. resolved. outpatient PCP follow up  after rehabilitation.     Problem/Plan - 5:  ·  Problem: Hypokalemia.   ·  Plan: replete again and recheck,      Problem/Plan - 6:  ·  Problem: Liver lesion.   ·  Plan: lft wnl  f/u outpatient.    Problem/Plan - 7:  ·  Problem: Alzheimer's disease with Parkinson's disease.   ·  Plan: Resume home meds; carbidopa/levodopa, seroquel, escitalopram.    Problem/Plan - 8:  ·  Problem: Hypotension.   ·  Plan: cont midodrine.    Problem/Plan - 9:  ·  Problem: Prophylactic measure.   ·  Plan: lovenox.  FC  Medically ready pending insurance auth for rehabilitation.     Time spent by me managing the patient including but not limited to reviewing the chart, discussion with the IDR team (nurse//care manager/ACP), physical exam and assessment and plan is 36 mins   
74-year-old female with past medical history of Parkinson's disease with dementia and behavioral disturbance, hyperlipidemia, hypotension on midodrine, and mood disorder who was brought in the ED by daughter with reports of loose stools and abdominal pain. Found with mild nonspecific colitis, hypokalemia and TERRELL. Admit to medicine

## 2024-09-19 NOTE — DISCHARGE NOTE PROVIDER - NSDCFUADDINST_GEN_ALL_CORE_FT
1) It is important to see your primary physician within next 7-10 days to perform a comprehensive medical review.  Call their offices for an appointment as soon as you leave the hospital.  If you do not have a primary physician or unable to reach your PCP, contact the [facility] Physician Referral Service at [phone].  Your medical issues appear to be stable at this time, but if your symptoms recur or worsen, contact your physicians and/or return to the hospital if necessary.  If you encounter any issues or questions with your medication, call your physicians before stopping the medication.    2) Please access [facility] Patient portal (as instructed on the discharge paperwork) to access your medical records at any time after discharge.

## 2024-09-19 NOTE — DISCHARGE NOTE NURSING/CASE MANAGEMENT/SOCIAL WORK - PATIENT PORTAL LINK FT
You can access the FollowMyHealth Patient Portal offered by [facility] by registering at the following website: [email or web address]. By joining [facility]’s FollowMyHealth portal, you will also be able to view your health information using other applications (apps) compatible with our system.

## 2024-09-19 NOTE — DISCHARGE NOTE PROVIDER - NSDCMRMEDTOKEN_GEN_ALL_CORE_FT
atorvastatin 10 mg oral tablet: 1 tab(s) orally once a day (at bedtime)  carbidopa-levodopa 10 mg-100 mg oral tablet: 1 tab(s) orally 3 times a day  escitalopram 10 mg oral tablet: 1 tab(s) orally once a day  midodrine 2.5 mg oral tablet: 1 tab(s) orally 3 times a day  polyethylene glycol 3350 oral powder for reconstitution: 17 gram(s) orally once a day  senna leaf extract oral tablet: 2 tab(s) orally once a day (at bedtime)  Seroquel 25 mg oral tablet: 1 tab(s) orally 2 times a day

## 2024-09-19 NOTE — DISCHARGE NOTE PROVIDER - HOSPITAL COURSE
74-year-old female with past medical history of Parkinson's disease with dementia and behavioral disturbance, hyperlipidemia, hypotension on midodrine, and mood disorder who was brought in the ED by daughter with reports of loose stools and abdominal pain. Found with mild nonspecific colitis, hypokalemia and TERRELL. Admit to medicine    Problem/Plan - 1:  ·  Problem: Acute stercoral colitis associated with severe constipation and lactic acidosis. improved. off antibiotics  no fever no abd pain  cont laxatives with aim to have 1-2 BM a day.     2. Hypophosphatemia: Repleted    3. AOCD, Hb declined likely from hemodilution with hydration. check anemia work up      Problem/Plan - 4:  ·  Problem: TERRELL (acute kidney injury).   Likely pre-renal with poor oral fluid intake. resolved. outpatient PCP follow up  after rehabilitation.     Problem/Plan - 5:  ·  Problem: Hypokalemia.   ·  Plan: repleted      Problem/Plan - 6:  ·  Problem: Liver lesion.   ·  Plan: lft wnl  f/u outpatient.    Problem/Plan - 7:  ·  Problem: Alzheimer's disease with Parkinson's disease.   ·  Plan: Resume home meds; carbidopa/levodopa, seroquel, escitalopram.    Problem/Plan - 8:  ·  Problem: Hypotension.   ·  Plan: cont midodrine.    PT recommended rehab but Family wants patient home with home PT    Seen and examined by me today. Vitals stable.    All questions welcomed and answered appropriately. Patient verbalized understanding of post discharge physician's follows up and discharge instructions.   DC time spent by me face to face excluding billable procedures 38 mins

## 2024-09-19 NOTE — PROGRESS NOTE ADULT - SUBJECTIVE AND OBJECTIVE BOX
CHIEF COMPLAINT: Follow up of stercoral colitis with constipation.   no abd pain   no fever  no report of any chest pain or sob       PHYSICAL EXAM:    GENERAL: malnourished and no acute distress   CHEST/LUNG:  No wheezing, no crackles   HEART: Regular rate and rhythm; +SM  ABDOMEN: Soft, Nontender, Nondistended; Bowel sounds present  EXTREMITIES:  No clubbing, cyanosis, or edema  Psychiatry: AAO x 1-2      OBJECTIVE DATA:   Vital Signs Last 24 Hrs  T(C): 36.8 (19 Sep 2024 05:13), Max: 37 (18 Sep 2024 23:53)  T(F): 98.2 (19 Sep 2024 05:13), Max: 98.6 (18 Sep 2024 23:53)  HR: 72 (19 Sep 2024 05:13) (66 - 79)  BP: 131/78 (19 Sep 2024 05:13) (112/70 - 152/87)  BP(mean): --  RR: 18 (19 Sep 2024 05:13) (17 - 18)  SpO2: 98% (19 Sep 2024 05:13) (95% - 100%)    Parameters below as of 18 Sep 2024 18:00  Patient On (Oxygen Delivery Method): room air      LABS:                        10.1   5.94  )-----------( 169      ( 19 Sep 2024 07:00 )             30.7             09-19    140  |  110[H]  |  10  ----------------------------<  85  3.3[L]   |  25  |  0.68    Ca    8.4[L]      19 Sep 2024 07:00  Phos  3.5     09-19                  Urinalysis Basic - ( 19 Sep 2024 07:00 )    Color: x / Appearance: x / SG: x / pH: x  Gluc: 85 mg/dL / Ketone: x  / Bili: x / Urobili: x   Blood: x / Protein: x / Nitrite: x   Leuk Esterase: x / RBC: x / WBC x   Sq Epi: x / Non Sq Epi: x / Bacteria: x            CAPILLARY BLOOD GLUCOSE          Culture - Urine  Source: Clean Catch Clean Catch (Midstream)  Final Report (09-17):    <10,000 CFU/mL Normal Urogenital Dilma         MEDICATIONS  (STANDING):  atorvastatin 10 milliGRAM(s) Oral at bedtime  carbidopa/levodopa  10/100 1 Tablet(s) Oral three times a day  enoxaparin Injectable 40 milliGRAM(s) SubCutaneous every 24 hours  escitalopram 10 milliGRAM(s) Oral daily  midodrine. 2.5 milliGRAM(s) Oral three times a day  polyethylene glycol 3350 17 Gram(s) Oral daily  potassium chloride   Powder 40 milliEquivalent(s) Oral once  potassium phosphate IVPB 15 milliMole(s) IV Intermittent once  QUEtiapine 25 milliGRAM(s) Oral two times a day  senna 2 Tablet(s) Oral at bedtime    MEDICATIONS  (PRN):  acetaminophen     Tablet .. 650 milliGRAM(s) Oral every 6 hours PRN Temp greater or equal to 38C (100.4F), Mild Pain (1 - 3)  aluminum hydroxide/magnesium hydroxide/simethicone Suspension 30 milliLiter(s) Oral every 4 hours PRN Dyspepsia  melatonin 3 milliGRAM(s) Oral at bedtime PRN Insomnia  ondansetron Injectable 4 milliGRAM(s) IV Push every 8 hours PRN Nausea and/or Vomiting      
PROGRESS NOTE:     Patient is a 74y old  Female who presents with a chief complaint of Possible colitis (16 Sep 2024 07:00)          SUBJECTIVE & OBJECTIVE:   Pt seen and examined at bedside in AM    no overnight events.       REVIEW OF SYSTEMS: remaining ROS negative     PHYSICAL EXAM:  VITALS:  Vital Signs Last 24 Hrs  T(C): 36.6 (16 Sep 2024 12:30), Max: 37 (16 Sep 2024 09:48)  T(F): 97.9 (16 Sep 2024 12:30), Max: 98.6 (16 Sep 2024 09:48)  HR: 65 (16 Sep 2024 12:30) (65 - 69)  BP: 118/78 (16 Sep 2024 12:30) (112/73 - 127/83)  BP(mean): 91 (16 Sep 2024 12:30) (91 - 91)  RR: 16 (16 Sep 2024 12:30) (16 - 18)  SpO2: 98% (16 Sep 2024 12:30) (96% - 98%)    Parameters below as of 16 Sep 2024 12:30  Patient On (Oxygen Delivery Method): room air    O2 Concentration (%): 98      GENERAL: NAD,  no increased WOB  HEAD:  Atraumatic, Normocephalic  EYES: EOMI, conjunctiva and sclera clear  ENMT: Moist mucous membranes  NECK: Supple, No JVD  NERVOUS SYSTEM:  Alert   CHEST/LUNG: Clear to auscultation bilaterally; No rales, rhonchi, wheezing  HEART: Regular rate and rhythm  ABDOMEN: Soft, Nontender, Nondistended; Bowel sounds present  EXTREMITIES:   No clubbing, cyanosis, calf tenderness or edema b/l      MEDICATIONS  (STANDING):  atorvastatin 10 milliGRAM(s) Oral at bedtime  carbidopa/levodopa  10/100 1 Tablet(s) Oral three times a day  ciprofloxacin   IVPB 400 milliGRAM(s) IV Intermittent every 12 hours  escitalopram 10 milliGRAM(s) Oral daily  heparin   Injectable 5000 Unit(s) SubCutaneous every 8 hours  lactated ringers. 1000 milliLiter(s) (75 mL/Hr) IV Continuous <Continuous>  metroNIDAZOLE  IVPB 500 milliGRAM(s) IV Intermittent every 12 hours  midodrine. 2.5 milliGRAM(s) Oral three times a day  polyethylene glycol 3350 17 Gram(s) Oral daily  QUEtiapine 25 milliGRAM(s) Oral two times a day  senna 2 Tablet(s) Oral at bedtime    MEDICATIONS  (PRN):  acetaminophen     Tablet .. 650 milliGRAM(s) Oral every 6 hours PRN Temp greater or equal to 38C (100.4F), Mild Pain (1 - 3)  aluminum hydroxide/magnesium hydroxide/simethicone Suspension 30 milliLiter(s) Oral every 4 hours PRN Dyspepsia  melatonin 3 milliGRAM(s) Oral at bedtime PRN Insomnia  ondansetron Injectable 4 milliGRAM(s) IV Push every 8 hours PRN Nausea and/or Vomiting      Allergies    sulfa drugs (Angioedema)    Intolerances              LABS:                           11.4   11.28 )-----------( 190      ( 16 Sep 2024 06:50 )             33.9         139  |  106  |  17  ----------------------------<  170<H>  2.9<LL>   |  24  |  1.36<H>    Ca    10.0      16 Sep 2024 02:28    TPro  7.4  /  Alb  4.0  /  TBili  1.0  /  DBili  x   /  AST  30  /  ALT  14  /  AlkPhos  57        Urinalysis Basic - ( 16 Sep 2024 06:15 )    Color: Dark Yellow / Appearance: Clear / S.027 / pH: x  Gluc: x / Ketone: Trace mg/dL  / Bili: Small / Urobili: 1.0 mg/dL   Blood: x / Protein: Trace mg/dL / Nitrite: Negative   Leuk Esterase: Negative / RBC: x / WBC x   Sq Epi: x / Non Sq Epi: x / Bacteria: x      CAPILLARY BLOOD GLUCOSE                    RECENT CULTURES:          RADIOLOGY & ADDITIONAL TESTS:    < from: CT Abdomen and Pelvis w/ IV Cont (24 @ 05:21) >  IMPRESSION:    Mild nonspecific colitis involving transverse colon through rectosigmoid.  Moderate fecal load. Moderate looping of the colon suggests chronic   constipation.    An indeterminate 6 mm hypodensity in the right lobe of the liver    < end of copied text >    
PROGRESS NOTE:     Patient is a 74y old  Female who presents with a chief complaint of Possible colitis (16 Sep 2024 07:00)          SUBJECTIVE & OBJECTIVE:   Pt seen and examined at bedside in AM. pt had BM yesterday   no overnight events.       REVIEW OF SYSTEMS: limited    PHYSICAL EXAM:  VITALS:  Vital Signs Last 24 Hrs  T(C): 36.7 (18 Sep 2024 10:39), Max: 37.1 (18 Sep 2024 00:35)  T(F): 98 (18 Sep 2024 10:39), Max: 98.8 (18 Sep 2024 00:35)  HR: 66 (18 Sep 2024 10:39) (66 - 81)  BP: 112/70 (18 Sep 2024 10:39) (112/70 - 137/91)  BP(mean): --  RR: 18 (18 Sep 2024 10:39) (17 - 18)  SpO2: 100% (18 Sep 2024 10:39) (97% - 100%)    Parameters below as of 18 Sep 2024 10:39  Patient On (Oxygen Delivery Method): room air      GENERAL: NAD,  no increased WOB  HEAD:  Atraumatic, Normocephalic  EYES: EOMI, conjunctiva and sclera clear  ENMT: Moist mucous membranes  NECK: Supple, No JVD  NERVOUS SYSTEM:  Alert   CHEST/LUNG: Clear to auscultation bilaterally; No rales, rhonchi, wheezing  HEART: Regular rate and rhythm  ABDOMEN: Soft, Nontender, Nondistended; Bowel sounds present  EXTREMITIES:   No clubbing, cyanosis, calf tenderness or edema b/l      MEDICATIONS  (STANDING):  atorvastatin 10 milliGRAM(s) Oral at bedtime  carbidopa/levodopa  10/100 1 Tablet(s) Oral three times a day  enoxaparin Injectable 40 milliGRAM(s) SubCutaneous every 24 hours  escitalopram 10 milliGRAM(s) Oral daily  lactated ringers. 1000 milliLiter(s) (75 mL/Hr) IV Continuous <Continuous>  midodrine. 2.5 milliGRAM(s) Oral three times a day  polyethylene glycol 3350 17 Gram(s) Oral daily  QUEtiapine 25 milliGRAM(s) Oral two times a day  senna 2 Tablet(s) Oral at bedtime    MEDICATIONS  (PRN):  acetaminophen     Tablet .. 650 milliGRAM(s) Oral every 6 hours PRN Temp greater or equal to 38C (100.4F), Mild Pain (1 - 3)  aluminum hydroxide/magnesium hydroxide/simethicone Suspension 30 milliLiter(s) Oral every 4 hours PRN Dyspepsia  melatonin 3 milliGRAM(s) Oral at bedtime PRN Insomnia  ondansetron Injectable 4 milliGRAM(s) IV Push every 8 hours PRN Nausea and/or Vomiting        Allergies    sulfa drugs (Angioedema)    Intolerances              LABS:                                      9.5    7.09  )-----------( 158      ( 18 Sep 2024 07:20 )             29.1     09-18    143  |  111[H]  |  8   ----------------------------<  85  3.3[L]   |  27  |  0.65    Ca    8.4[L]      18 Sep 2024 07:20  Phos  2.0     09-17  Mg     2.0     09-17    TPro  5.4[L]  /  Alb  2.6[L]  /  TBili  0.5  /  DBili  x   /  AST  17  /  ALT  15  /  AlkPhos  41  09-17    LIVER FUNCTIONS - ( 17 Sep 2024 07:07 )  Alb: 2.6 g/dL / Pro: 5.4 gm/dL / ALK PHOS: 41 U/L / ALT: 15 U/L / AST: 17 U/L / GGT: x             Urinalysis Basic - ( 18 Sep 2024 07:20 )    Color: x / Appearance: x / SG: x / pH: x  Gluc: 85 mg/dL / Ketone: x  / Bili: x / Urobili: x   Blood: x / Protein: x / Nitrite: x   Leuk Esterase: x / RBC: x / WBC x   Sq Epi: x / Non Sq Epi: x / Bacteria: x              CAPILLARY BLOOD GLUCOSE                    RECENT CULTURES:          RADIOLOGY & ADDITIONAL TESTS:    < from: CT Abdomen and Pelvis w/ IV Cont (09.16.24 @ 05:21) >  IMPRESSION:    Mild nonspecific colitis involving transverse colon through rectosigmoid.  Moderate fecal load. Moderate looping of the colon suggests chronic   constipation.    An indeterminate 6 mm hypodensity in the right lobe of the liver    < end of copied text >        < from: CT Head No Cont (09.17.24 @ 15:56) >  Impression: No evidence of acute hemorrhage, mass or mass effect.    < end of copied text >    
PROGRESS NOTE:     Patient is a 74y old  Female who presents with a chief complaint of Possible colitis (16 Sep 2024 07:00)          SUBJECTIVE & OBJECTIVE:   Pt seen and examined at bedside in AM    no overnight events.       REVIEW OF SYSTEMS: limited    PHYSICAL EXAM:  VITALS:  Vital Signs Last 24 Hrs  T(C): 36.8 (17 Sep 2024 11:01), Max: 37.1 (17 Sep 2024 00:08)  T(F): 98.3 (17 Sep 2024 11:01), Max: 98.7 (17 Sep 2024 00:08)  HR: 65 (17 Sep 2024 11:01) (65 - 78)  BP: 114/76 (17 Sep 2024 11:01) (114/76 - 154/81)  BP(mean): --  RR: 18 (17 Sep 2024 11:01) (17 - 18)  SpO2: 99% (17 Sep 2024 11:01) (98% - 100%)    Parameters below as of 17 Sep 2024 11:01  Patient On (Oxygen Delivery Method): room air      GENERAL: NAD,  no increased WOB  HEAD:  Atraumatic, Normocephalic  EYES: EOMI, conjunctiva and sclera clear  ENMT: Moist mucous membranes  NECK: Supple, No JVD  NERVOUS SYSTEM:  Alert   CHEST/LUNG: Clear to auscultation bilaterally; No rales, rhonchi, wheezing  HEART: Regular rate and rhythm  ABDOMEN: Soft, Nontender, Nondistended; Bowel sounds present  EXTREMITIES:   No clubbing, cyanosis, calf tenderness or edema b/l      MEDICATIONS  (STANDING):  atorvastatin 10 milliGRAM(s) Oral at bedtime  carbidopa/levodopa  10/100 1 Tablet(s) Oral three times a day  escitalopram 10 milliGRAM(s) Oral daily  heparin   Injectable 5000 Unit(s) SubCutaneous every 12 hours  lactated ringers. 1000 milliLiter(s) (75 mL/Hr) IV Continuous <Continuous>  midodrine. 2.5 milliGRAM(s) Oral three times a day  polyethylene glycol 3350 17 Gram(s) Oral daily  QUEtiapine 25 milliGRAM(s) Oral two times a day  senna 2 Tablet(s) Oral at bedtime    MEDICATIONS  (PRN):  acetaminophen     Tablet .. 650 milliGRAM(s) Oral every 6 hours PRN Temp greater or equal to 38C (100.4F), Mild Pain (1 - 3)  aluminum hydroxide/magnesium hydroxide/simethicone Suspension 30 milliLiter(s) Oral every 4 hours PRN Dyspepsia  melatonin 3 milliGRAM(s) Oral at bedtime PRN Insomnia  ondansetron Injectable 4 milliGRAM(s) IV Push every 8 hours PRN Nausea and/or Vomiting        Allergies    sulfa drugs (Angioedema)    Intolerances              LABS:                                      9.8    7.51  )-----------( 154      ( 17 Sep 2024 07:07 )             29.5     09-17    141  |  113[H]  |  11  ----------------------------<  74  3.6   |  24  |  0.82    Ca    7.9[L]      17 Sep 2024 07:07  Phos  2.0     09-17  Mg     2.0     09-17    TPro  5.4[L]  /  Alb  2.6[L]  /  TBili  0.5  /  DBili  x   /  AST  17  /  ALT  15  /  AlkPhos  41  09-17    LIVER FUNCTIONS - ( 17 Sep 2024 07:07 )  Alb: 2.6 g/dL / Pro: 5.4 gm/dL / ALK PHOS: 41 U/L / ALT: 15 U/L / AST: 17 U/L / GGT: x             Urinalysis Basic - ( 17 Sep 2024 07:07 )    Color: x / Appearance: x / SG: x / pH: x  Gluc: 74 mg/dL / Ketone: x  / Bili: x / Urobili: x   Blood: x / Protein: x / Nitrite: x   Leuk Esterase: x / RBC: x / WBC x   Sq Epi: x / Non Sq Epi: x / Bacteria: x          CAPILLARY BLOOD GLUCOSE                    RECENT CULTURES:          RADIOLOGY & ADDITIONAL TESTS:    < from: CT Abdomen and Pelvis w/ IV Cont (09.16.24 @ 05:21) >  IMPRESSION:    Mild nonspecific colitis involving transverse colon through rectosigmoid.  Moderate fecal load. Moderate looping of the colon suggests chronic   constipation.    An indeterminate 6 mm hypodensity in the right lobe of the liver    < end of copied text >

## 2024-09-19 NOTE — DISCHARGE NOTE NURSING/CASE MANAGEMENT/SOCIAL WORK - NSDCPEFALRISK_GEN_ALL_CORE
Get labwork done now  Fill out blood sugar log and send it to us in two weeks  Get your labs done prior to your next visit in three months 
For information on Fall & Injury Prevention, visit: [email or web address]  [email or web address] OR  https://www.cdc.gov/steadi/patient.html

## 2024-09-20 LAB
CULTURE RESULTS: SIGNIFICANT CHANGE UP
SPECIMEN SOURCE: SIGNIFICANT CHANGE UP

## 2024-09-24 DIAGNOSIS — F02.818 DEMENTIA IN OTHER DISEASES CLASSIFIED ELSEWHERE, UNSPECIFIED SEVERITY, WITH OTHER BEHAVIORAL DISTURBANCE: ICD-10-CM

## 2024-09-24 DIAGNOSIS — I95.9 HYPOTENSION, UNSPECIFIED: ICD-10-CM

## 2024-09-24 DIAGNOSIS — K21.9 GASTRO-ESOPHAGEAL REFLUX DISEASE WITHOUT ESOPHAGITIS: ICD-10-CM

## 2024-09-24 DIAGNOSIS — I10 ESSENTIAL (PRIMARY) HYPERTENSION: ICD-10-CM

## 2024-09-24 DIAGNOSIS — N17.9 ACUTE KIDNEY FAILURE, UNSPECIFIED: ICD-10-CM

## 2024-09-24 DIAGNOSIS — E46 UNSPECIFIED PROTEIN-CALORIE MALNUTRITION: ICD-10-CM

## 2024-09-24 DIAGNOSIS — K52.9 NONINFECTIVE GASTROENTERITIS AND COLITIS, UNSPECIFIED: ICD-10-CM

## 2024-09-24 DIAGNOSIS — D63.8 ANEMIA IN OTHER CHRONIC DISEASES CLASSIFIED ELSEWHERE: ICD-10-CM

## 2024-09-24 DIAGNOSIS — E87.6 HYPOKALEMIA: ICD-10-CM

## 2024-09-24 DIAGNOSIS — M19.90 UNSPECIFIED OSTEOARTHRITIS, UNSPECIFIED SITE: ICD-10-CM

## 2024-09-24 DIAGNOSIS — F39 UNSPECIFIED MOOD [AFFECTIVE] DISORDER: ICD-10-CM

## 2024-09-24 DIAGNOSIS — G20.A1 PARKINSON'S DISEASE WITHOUT DYSKINESIA, WITHOUT MENTION OF FLUCTUATIONS: ICD-10-CM

## 2024-09-24 DIAGNOSIS — E83.39 OTHER DISORDERS OF PHOSPHORUS METABOLISM: ICD-10-CM

## 2024-09-24 DIAGNOSIS — E87.20 ACIDOSIS, UNSPECIFIED: ICD-10-CM

## 2024-09-24 DIAGNOSIS — K59.00 CONSTIPATION, UNSPECIFIED: ICD-10-CM

## 2024-09-24 DIAGNOSIS — E78.5 HYPERLIPIDEMIA, UNSPECIFIED: ICD-10-CM

## 2024-09-24 DIAGNOSIS — Z88.2 ALLERGY STATUS TO SULFONAMIDES: ICD-10-CM

## 2024-09-24 DIAGNOSIS — K76.9 LIVER DISEASE, UNSPECIFIED: ICD-10-CM

## 2025-02-27 ENCOUNTER — RX RENEWAL (OUTPATIENT)
Age: 75
End: 2025-02-27

## 2025-02-27 RX ORDER — CARBIDOPA AND LEVODOPA 25; 250 MG/1; MG/1
25-250 TABLET ORAL
Qty: 405 | Refills: 3 | Status: ACTIVE | COMMUNITY
Start: 2025-02-27 | End: 1900-01-01

## 2025-03-25 NOTE — ED ADULT NURSE NOTE - NS ED NURSE RECORD ANOTHER HT AND WT
[No Acute Distress] : no acute distress [Normal Oropharynx] : normal oropharynx [Normal Appearance] : normal appearance [No Neck Mass] : no neck mass [Normal Rate/Rhythm] : normal rate/rhythm [Normal S1, S2] : normal s1, s2 [No Murmurs] : no murmurs [No Resp Distress] : no resp distress [Clear to Auscultation Bilaterally] : clear to auscultation bilaterally [No Abnormalities] : no abnormalities [Benign] : benign [Normal Gait] : normal gait [No Clubbing] : no clubbing [No Cyanosis] : no cyanosis [No Edema] : no edema [FROM] : FROM [Normal Color/ Pigmentation] : normal color/ pigmentation [No Focal Deficits] : no focal deficits [Oriented x3] : oriented x3 [Normal Affect] : normal affect Yes

## 2025-08-07 ENCOUNTER — APPOINTMENT (OUTPATIENT)
Dept: NEUROLOGY | Facility: CLINIC | Age: 75
End: 2025-08-07
Payer: MEDICARE

## 2025-08-07 VITALS
HEART RATE: 85 BPM | SYSTOLIC BLOOD PRESSURE: 132 MMHG | BODY MASS INDEX: 18.61 KG/M2 | WEIGHT: 105 LBS | DIASTOLIC BLOOD PRESSURE: 86 MMHG | HEIGHT: 63 IN

## 2025-08-07 DIAGNOSIS — F02.818 PARKINSON'S DISEASE WITHOUT DYSKINESIA, WITHOUT MENTION OF FLUCTUATIONS: ICD-10-CM

## 2025-08-07 DIAGNOSIS — G20.A1 PARKINSON'S DISEASE WITHOUT DYSKINESIA, WITHOUT MENTION OF FLUCTUATIONS: ICD-10-CM

## 2025-08-07 PROCEDURE — 99213 OFFICE O/P EST LOW 20 MIN: CPT
